# Patient Record
Sex: FEMALE | Race: WHITE | Employment: FULL TIME | URBAN - NONMETROPOLITAN AREA
[De-identification: names, ages, dates, MRNs, and addresses within clinical notes are randomized per-mention and may not be internally consistent; named-entity substitution may affect disease eponyms.]

---

## 2017-01-12 ENCOUNTER — OFFICE VISIT (OUTPATIENT)
Dept: INTERNAL MEDICINE | Age: 52
End: 2017-01-12

## 2017-01-12 VITALS
HEART RATE: 78 BPM | HEIGHT: 64 IN | BODY MASS INDEX: 30.29 KG/M2 | WEIGHT: 177.4 LBS | DIASTOLIC BLOOD PRESSURE: 78 MMHG | RESPIRATION RATE: 16 BRPM | SYSTOLIC BLOOD PRESSURE: 122 MMHG

## 2017-01-12 DIAGNOSIS — K21.9 GASTROESOPHAGEAL REFLUX DISEASE WITHOUT ESOPHAGITIS: ICD-10-CM

## 2017-01-12 DIAGNOSIS — E66.9 NON MORBID OBESITY, UNSPECIFIED OBESITY TYPE: ICD-10-CM

## 2017-01-12 DIAGNOSIS — E03.4 HYPOTHYROIDISM DUE TO ACQUIRED ATROPHY OF THYROID: ICD-10-CM

## 2017-01-12 DIAGNOSIS — R73.01 ELEVATED FASTING GLUCOSE: Primary | ICD-10-CM

## 2017-01-12 DIAGNOSIS — I10 ESSENTIAL HYPERTENSION: ICD-10-CM

## 2017-01-12 DIAGNOSIS — E16.1 HYPERINSULINEMIA: ICD-10-CM

## 2017-01-12 PROCEDURE — 99213 OFFICE O/P EST LOW 20 MIN: CPT | Performed by: INTERNAL MEDICINE

## 2017-03-06 RX ORDER — LISINOPRIL AND HYDROCHLOROTHIAZIDE 12.5; 1 MG/1; MG/1
TABLET ORAL
Qty: 90 TABLET | Refills: 3 | Status: SHIPPED | OUTPATIENT
Start: 2017-03-06 | End: 2018-04-04 | Stop reason: SDUPTHER

## 2017-04-12 ENCOUNTER — OFFICE VISIT (OUTPATIENT)
Dept: PRIMARY CARE CLINIC | Age: 52
End: 2017-04-12
Payer: COMMERCIAL

## 2017-04-12 VITALS
BODY MASS INDEX: 30.73 KG/M2 | TEMPERATURE: 97.7 F | SYSTOLIC BLOOD PRESSURE: 130 MMHG | DIASTOLIC BLOOD PRESSURE: 84 MMHG | WEIGHT: 180 LBS | RESPIRATION RATE: 16 BRPM | OXYGEN SATURATION: 98 % | HEART RATE: 66 BPM | HEIGHT: 64 IN

## 2017-04-12 DIAGNOSIS — J01.40 ACUTE NON-RECURRENT PANSINUSITIS: Primary | ICD-10-CM

## 2017-04-12 PROCEDURE — 99214 OFFICE O/P EST MOD 30 MIN: CPT | Performed by: FAMILY MEDICINE

## 2017-04-12 RX ORDER — GUAIFENESIN AND CODEINE PHOSPHATE 100; 10 MG/5ML; MG/5ML
10 SOLUTION ORAL 4 TIMES DAILY PRN
Qty: 180 ML | Refills: 0 | Status: SHIPPED | OUTPATIENT
Start: 2017-04-12 | End: 2018-01-05 | Stop reason: ALTCHOICE

## 2017-04-12 RX ORDER — AMOXICILLIN AND CLAVULANATE POTASSIUM 500; 125 MG/1; MG/1
1 TABLET, FILM COATED ORAL 2 TIMES DAILY
Qty: 20 TABLET | Refills: 0 | Status: SHIPPED | OUTPATIENT
Start: 2017-04-12 | End: 2017-04-22

## 2017-04-12 ASSESSMENT — ENCOUNTER SYMPTOMS
COUGH: 1
SHORTNESS OF BREATH: 0
VOMITING: 0
WHEEZING: 1
SINUS PRESSURE: 1
DIARRHEA: 0
NAUSEA: 0
ABDOMINAL PAIN: 0
CONSTIPATION: 0
RHINORRHEA: 1
EYE REDNESS: 0
TROUBLE SWALLOWING: 0
SORE THROAT: 0
EYE DISCHARGE: 0

## 2017-06-21 RX ORDER — OMEPRAZOLE 20 MG/1
CAPSULE, DELAYED RELEASE ORAL
Qty: 90 CAPSULE | Refills: 3 | Status: SHIPPED | OUTPATIENT
Start: 2017-06-21 | End: 2018-09-11

## 2017-07-05 ENCOUNTER — OFFICE VISIT (OUTPATIENT)
Dept: INTERNAL MEDICINE | Age: 52
End: 2017-07-05
Payer: COMMERCIAL

## 2017-07-05 VITALS
SYSTOLIC BLOOD PRESSURE: 116 MMHG | HEART RATE: 78 BPM | WEIGHT: 184.4 LBS | RESPIRATION RATE: 16 BRPM | BODY MASS INDEX: 31.48 KG/M2 | DIASTOLIC BLOOD PRESSURE: 78 MMHG | HEIGHT: 64 IN

## 2017-07-05 DIAGNOSIS — R11.2 NON-INTRACTABLE VOMITING WITH NAUSEA, UNSPECIFIED VOMITING TYPE: ICD-10-CM

## 2017-07-05 DIAGNOSIS — I10 ESSENTIAL HYPERTENSION: ICD-10-CM

## 2017-07-05 DIAGNOSIS — R10.13 EPIGASTRIC PAIN: ICD-10-CM

## 2017-07-05 DIAGNOSIS — E03.4 HYPOTHYROIDISM DUE TO ACQUIRED ATROPHY OF THYROID: ICD-10-CM

## 2017-07-05 DIAGNOSIS — R73.01 ELEVATED FASTING GLUCOSE: Primary | ICD-10-CM

## 2017-07-05 DIAGNOSIS — E66.9 NON MORBID OBESITY, UNSPECIFIED OBESITY TYPE: ICD-10-CM

## 2017-07-05 DIAGNOSIS — K21.9 GASTROESOPHAGEAL REFLUX DISEASE WITHOUT ESOPHAGITIS: ICD-10-CM

## 2017-07-05 PROCEDURE — 99214 OFFICE O/P EST MOD 30 MIN: CPT | Performed by: INTERNAL MEDICINE

## 2017-07-05 RX ORDER — LISINOPRIL AND HYDROCHLOROTHIAZIDE 12.5; 1 MG/1; MG/1
1 TABLET ORAL
COMMUNITY
End: 2017-07-05

## 2017-07-05 RX ORDER — COVID-19 ANTIGEN TEST
KIT MISCELLANEOUS
COMMUNITY
End: 2017-07-05 | Stop reason: CLARIF

## 2017-07-05 RX ORDER — LEVOTHYROXINE SODIUM 0.03 MG/1
25 TABLET ORAL
COMMUNITY
End: 2017-07-05 | Stop reason: CLARIF

## 2017-07-05 RX ORDER — OMEPRAZOLE 10 MG/1
20 CAPSULE, DELAYED RELEASE ORAL
COMMUNITY
End: 2017-07-05 | Stop reason: CLARIF

## 2017-07-21 ENCOUNTER — INITIAL CONSULT (OUTPATIENT)
Dept: SURGERY | Age: 52
End: 2017-07-21
Payer: COMMERCIAL

## 2017-07-21 VITALS
HEIGHT: 64 IN | WEIGHT: 186 LBS | SYSTOLIC BLOOD PRESSURE: 120 MMHG | BODY MASS INDEX: 31.76 KG/M2 | DIASTOLIC BLOOD PRESSURE: 80 MMHG | HEART RATE: 70 BPM

## 2017-07-21 DIAGNOSIS — R10.13 EPIGASTRIC PAIN: Primary | ICD-10-CM

## 2017-07-21 PROCEDURE — 99215 OFFICE O/P EST HI 40 MIN: CPT | Performed by: SURGERY

## 2017-08-02 ENCOUNTER — OFFICE VISIT (OUTPATIENT)
Dept: SURGERY | Age: 52
End: 2017-08-02
Payer: COMMERCIAL

## 2017-08-02 VITALS
DIASTOLIC BLOOD PRESSURE: 86 MMHG | HEIGHT: 64 IN | HEART RATE: 78 BPM | TEMPERATURE: 98.8 F | BODY MASS INDEX: 31.58 KG/M2 | RESPIRATION RATE: 16 BRPM | WEIGHT: 185 LBS | SYSTOLIC BLOOD PRESSURE: 138 MMHG

## 2017-08-02 DIAGNOSIS — K80.10 CALCULUS OF GALLBLADDER WITH CHRONIC CHOLECYSTITIS WITHOUT OBSTRUCTION: Primary | ICD-10-CM

## 2017-08-02 PROCEDURE — 86850 RBC ANTIBODY SCREEN: CPT | Performed by: SURGERY

## 2017-08-02 PROCEDURE — 86901 BLOOD TYPING SEROLOGIC RH(D): CPT | Performed by: SURGERY

## 2017-08-02 PROCEDURE — 86900 BLOOD TYPING SEROLOGIC ABO: CPT | Performed by: SURGERY

## 2017-08-02 PROCEDURE — 99215 OFFICE O/P EST HI 40 MIN: CPT | Performed by: SURGERY

## 2017-08-04 DIAGNOSIS — Z13.0 SCREENING FOR DISORDER OF BLOOD AND BLOOD-FORMING ORGANS: Primary | ICD-10-CM

## 2017-08-04 DIAGNOSIS — K80.10 CALCULUS OF GALLBLADDER WITH CHRONIC CHOLECYSTITIS WITHOUT OBSTRUCTION: ICD-10-CM

## 2017-08-04 PROCEDURE — 86850 RBC ANTIBODY SCREEN: CPT | Performed by: SURGERY

## 2017-08-04 PROCEDURE — 86901 BLOOD TYPING SEROLOGIC RH(D): CPT | Performed by: SURGERY

## 2017-08-04 PROCEDURE — 86900 BLOOD TYPING SEROLOGIC ABO: CPT | Performed by: SURGERY

## 2017-08-15 ENCOUNTER — OFFICE VISIT (OUTPATIENT)
Dept: SURGERY | Age: 52
End: 2017-08-15

## 2017-08-15 VITALS
RESPIRATION RATE: 16 BRPM | BODY MASS INDEX: 31.55 KG/M2 | TEMPERATURE: 98 F | DIASTOLIC BLOOD PRESSURE: 88 MMHG | WEIGHT: 184.8 LBS | SYSTOLIC BLOOD PRESSURE: 124 MMHG | HEIGHT: 64 IN | HEART RATE: 80 BPM

## 2017-08-15 DIAGNOSIS — Z48.89 POSTOPERATIVE VISIT: Primary | ICD-10-CM

## 2017-08-15 PROCEDURE — 99024 POSTOP FOLLOW-UP VISIT: CPT | Performed by: SURGERY

## 2017-10-18 ENCOUNTER — TELEPHONE (OUTPATIENT)
Dept: INTERNAL MEDICINE | Age: 52
End: 2017-10-18

## 2017-10-18 NOTE — TELEPHONE ENCOUNTER
Patient has a yeast infection and was previously prescribed triamcinolone 0.025% cream for this. Can this be sent to [facility] pharmacy.

## 2018-01-05 ENCOUNTER — HOSPITAL ENCOUNTER (OUTPATIENT)
Dept: LAB | Age: 53
Setting detail: SPECIMEN
Discharge: HOME | End: 2018-01-05
Payer: COMMERCIAL

## 2018-01-05 ENCOUNTER — OFFICE VISIT (OUTPATIENT)
Dept: INTERNAL MEDICINE | Age: 53
End: 2018-01-05
Payer: COMMERCIAL

## 2018-01-05 VITALS
SYSTOLIC BLOOD PRESSURE: 126 MMHG | TEMPERATURE: 98 F | DIASTOLIC BLOOD PRESSURE: 78 MMHG | RESPIRATION RATE: 16 BRPM | WEIGHT: 188.6 LBS | HEART RATE: 68 BPM | BODY MASS INDEX: 31.42 KG/M2 | HEIGHT: 65 IN

## 2018-01-05 DIAGNOSIS — E66.9 NON MORBID OBESITY, UNSPECIFIED OBESITY TYPE: ICD-10-CM

## 2018-01-05 DIAGNOSIS — R73.01 ELEVATED FASTING GLUCOSE: Primary | ICD-10-CM

## 2018-01-05 DIAGNOSIS — E66.9 CLASS 1 OBESITY WITHOUT SERIOUS COMORBIDITY WITH BODY MASS INDEX (BMI) OF 31.0 TO 31.9 IN ADULT, UNSPECIFIED OBESITY TYPE: ICD-10-CM

## 2018-01-05 DIAGNOSIS — R79.89 ELEVATED LIVER FUNCTION TESTS: ICD-10-CM

## 2018-01-05 DIAGNOSIS — K21.9 GASTROESOPHAGEAL REFLUX DISEASE WITHOUT ESOPHAGITIS: ICD-10-CM

## 2018-01-05 DIAGNOSIS — E03.4 HYPOTHYROIDISM DUE TO ACQUIRED ATROPHY OF THYROID: ICD-10-CM

## 2018-01-05 DIAGNOSIS — R73.01 ELEVATED FASTING GLUCOSE: ICD-10-CM

## 2018-01-05 DIAGNOSIS — I10 ESSENTIAL HYPERTENSION: ICD-10-CM

## 2018-01-05 LAB
ABSOLUTE EOS #: 0.2 K/UL (ref 0–0.4)
ABSOLUTE IMMATURE GRANULOCYTE: NORMAL K/UL (ref 0–0.3)
ABSOLUTE LYMPH #: 2.2 K/UL (ref 1–4.8)
ABSOLUTE MONO #: 0.4 K/UL (ref 0.1–1.2)
ALBUMIN SERPL-MCNC: 4.6 G/DL (ref 3.5–5.2)
ALBUMIN/GLOBULIN RATIO: 1.5 (ref 1–2.5)
ALP BLD-CCNC: 65 U/L (ref 35–104)
ALT SERPL-CCNC: 35 U/L (ref 5–33)
ANION GAP SERPL CALCULATED.3IONS-SCNC: 14 MMOL/L (ref 9–17)
AST SERPL-CCNC: 22 U/L
BASOPHILS # BLD: 1 % (ref 0–1)
BASOPHILS ABSOLUTE: 0.1 K/UL (ref 0–0.2)
BILIRUB SERPL-MCNC: 0.66 MG/DL (ref 0.3–1.2)
BUN BLDV-MCNC: 20 MG/DL (ref 6–20)
BUN/CREAT BLD: 25 (ref 9–20)
CALCIUM SERPL-MCNC: 9.6 MG/DL (ref 8.6–10.4)
CHLORIDE BLD-SCNC: 98 MMOL/L (ref 98–107)
CHOLESTEROL/HDL RATIO: 3.4
CHOLESTEROL: 163 MG/DL
CO2: 27 MMOL/L (ref 20–31)
CREAT SERPL-MCNC: 0.81 MG/DL (ref 0.5–0.9)
DIFFERENTIAL TYPE: NORMAL
EOSINOPHILS RELATIVE PERCENT: 3 % (ref 1–7)
ESTIMATED AVERAGE GLUCOSE: 103 MG/DL
GFR AFRICAN AMERICAN: >60 ML/MIN
GFR NON-AFRICAN AMERICAN: >60 ML/MIN
GFR SERPL CREATININE-BSD FRML MDRD: ABNORMAL ML/MIN/{1.73_M2}
GFR SERPL CREATININE-BSD FRML MDRD: ABNORMAL ML/MIN/{1.73_M2}
GLUCOSE BLD-MCNC: 122 MG/DL (ref 70–99)
HBA1C MFR BLD: 5.2 % (ref 4.8–5.9)
HCT VFR BLD CALC: 45.2 % (ref 36–46)
HDLC SERPL-MCNC: 48 MG/DL
HEMOGLOBIN: 15.2 G/DL (ref 12–16)
IMMATURE GRANULOCYTES: NORMAL %
LDL CHOLESTEROL: 94 MG/DL (ref 0–130)
LYMPHOCYTES # BLD: 32 % (ref 16–46)
MCH RBC QN AUTO: 29.3 PG (ref 26–34)
MCHC RBC AUTO-ENTMCNC: 33.7 G/DL (ref 31–37)
MCV RBC AUTO: 87 FL (ref 80–100)
MONOCYTES # BLD: 6 % (ref 4–11)
PDW BLD-RTO: 13.9 % (ref 11–14.5)
PLATELET # BLD: 190 K/UL (ref 140–450)
PLATELET ESTIMATE: NORMAL
PMV BLD AUTO: 8.3 FL (ref 6–12)
POTASSIUM SERPL-SCNC: 4.1 MMOL/L (ref 3.7–5.3)
RBC # BLD: 5.19 M/UL (ref 4–5.2)
RBC # BLD: NORMAL 10*6/UL
SEG NEUTROPHILS: 58 % (ref 43–77)
SEGMENTED NEUTROPHILS ABSOLUTE COUNT: 4.1 K/UL (ref 1.8–7.7)
SODIUM BLD-SCNC: 139 MMOL/L (ref 135–144)
THYROXINE, FREE: 1.03 NG/DL (ref 0.93–1.7)
TOTAL PROTEIN: 7.7 G/DL (ref 6.4–8.3)
TRIGL SERPL-MCNC: 107 MG/DL
TSH SERPL DL<=0.05 MIU/L-ACNC: 1.1 MIU/L (ref 0.3–5)
VLDLC SERPL CALC-MCNC: NORMAL MG/DL (ref 1–30)
WBC # BLD: 7 K/UL (ref 3.5–11)
WBC # BLD: NORMAL 10*3/UL

## 2018-01-05 PROCEDURE — 83036 HEMOGLOBIN GLYCOSYLATED A1C: CPT

## 2018-01-05 PROCEDURE — 36415 COLL VENOUS BLD VENIPUNCTURE: CPT

## 2018-01-05 PROCEDURE — 80053 COMPREHEN METABOLIC PANEL: CPT

## 2018-01-05 PROCEDURE — 85025 COMPLETE CBC W/AUTO DIFF WBC: CPT

## 2018-01-05 PROCEDURE — 99214 OFFICE O/P EST MOD 30 MIN: CPT | Performed by: INTERNAL MEDICINE

## 2018-01-05 PROCEDURE — 84443 ASSAY THYROID STIM HORMONE: CPT

## 2018-01-05 PROCEDURE — 80061 LIPID PANEL: CPT

## 2018-01-05 PROCEDURE — 84439 ASSAY OF FREE THYROXINE: CPT

## 2018-01-05 ASSESSMENT — PATIENT HEALTH QUESTIONNAIRE - PHQ9
SUM OF ALL RESPONSES TO PHQ9 QUESTIONS 1 & 2: 0
2. FEELING DOWN, DEPRESSED OR HOPELESS: 0
SUM OF ALL RESPONSES TO PHQ QUESTIONS 1-9: 0
1. LITTLE INTEREST OR PLEASURE IN DOING THINGS: 0

## 2018-01-05 NOTE — PATIENT INSTRUCTIONS
Patient Education        Eating Healthy Foods: Care Instructions  Your Care Instructions    Eating healthy foods can help lower your risk for disease. Healthy food gives you energy and keeps your heart strong, your brain active, your muscles working, and your bones strong. A healthy diet includes a variety of foods from the basic food groups: grains, vegetables, fruits, milk and milk products, and meat and beans. Some people may eat more of their favorite foods from only one food group and, as a result, miss getting the nutrients they need. So, it is important to pay attention not only to what you eat but also to what you are missing from your diet. You can eat a healthy, balanced diet by making a few small changes. Follow-up care is a key part of your treatment and safety. Be sure to make and go to all appointments, and call your doctor if you are having problems. It's also a good idea to know your test results and keep a list of the medicines you take. How can you care for yourself at home? Look at what you eat  · Keep a food diary for a week or two and record everything you eat or drink. Track the number of servings you eat from each food group. · For a balanced diet every day, eat a variety of:  ¨ 6 or more ounce-equivalents of grains, such as cereals, breads, crackers, rice, or pasta, every day. An ounce-equivalent is 1 slice of bread, 1 cup of ready-to-eat cereal, or ½ cup of cooked rice, cooked pasta, or cooked cereal.  ¨ 2½ cups of vegetables, especially:  § Dark-green vegetables such as broccoli and spinach. § Orange vegetables such as carrots and sweet potatoes. § Dry beans (such as phillips and kidney beans) and peas (such as lentils). ¨ 2 cups of fresh, frozen, or canned fruit. A small apple or 1 banana or orange equals 1 cup. ¨ 3 cups of nonfat or low-fat milk, yogurt, or other milk products. ¨ 5½ ounces of meat and beans, such as chicken, fish, lean meat, beans, nuts, and seeds.  One egg, 1

## 2018-02-09 DIAGNOSIS — Z12.31 SCREENING MAMMOGRAM, ENCOUNTER FOR: Primary | ICD-10-CM

## 2018-02-27 ENCOUNTER — HOSPITAL ENCOUNTER (OUTPATIENT)
Dept: MAMMOGRAPHY | Age: 53
Discharge: HOME | End: 2018-03-01
Payer: COMMERCIAL

## 2018-02-27 DIAGNOSIS — Z12.31 SCREENING MAMMOGRAM, ENCOUNTER FOR: ICD-10-CM

## 2018-02-27 PROCEDURE — 77063 BREAST TOMOSYNTHESIS BI: CPT

## 2018-03-01 ENCOUNTER — TELEPHONE (OUTPATIENT)
Dept: INTERNAL MEDICINE | Age: 53
End: 2018-03-01

## 2018-03-01 DIAGNOSIS — R92.2 INCONCLUSIVE MAMMOGRAM: Primary | ICD-10-CM

## 2018-03-07 ENCOUNTER — HOSPITAL ENCOUNTER (OUTPATIENT)
Dept: MAMMOGRAPHY | Age: 53
Discharge: HOME | End: 2018-03-09
Payer: COMMERCIAL

## 2018-03-07 DIAGNOSIS — R92.2 INCONCLUSIVE MAMMOGRAM: ICD-10-CM

## 2018-03-07 PROCEDURE — 77065 DX MAMMO INCL CAD UNI: CPT

## 2018-04-04 RX ORDER — LEVOTHYROXINE SODIUM 0.07 MG/1
TABLET ORAL
Qty: 45 TABLET | Refills: 3 | Status: SHIPPED | OUTPATIENT
Start: 2018-04-04 | End: 2019-04-18 | Stop reason: SDUPTHER

## 2018-04-04 RX ORDER — LISINOPRIL AND HYDROCHLOROTHIAZIDE 12.5; 1 MG/1; MG/1
TABLET ORAL
Qty: 90 TABLET | Refills: 3 | Status: SHIPPED | OUTPATIENT
Start: 2018-04-04 | End: 2019-04-18 | Stop reason: SDUPTHER

## 2018-04-08 ENCOUNTER — HOSPITAL ENCOUNTER (EMERGENCY)
Age: 53
Discharge: HOME | End: 2018-04-08
Attending: FAMILY MEDICINE
Payer: COMMERCIAL

## 2018-04-08 ENCOUNTER — APPOINTMENT (OUTPATIENT)
Dept: GENERAL RADIOLOGY | Age: 53
End: 2018-04-08
Payer: COMMERCIAL

## 2018-04-08 VITALS
OXYGEN SATURATION: 98 % | HEART RATE: 88 BPM | DIASTOLIC BLOOD PRESSURE: 75 MMHG | TEMPERATURE: 99 F | SYSTOLIC BLOOD PRESSURE: 140 MMHG | RESPIRATION RATE: 18 BRPM

## 2018-04-08 DIAGNOSIS — S52.352A CLOSED DISPLACED COMMINUTED FRACTURE OF SHAFT OF LEFT RADIUS, INITIAL ENCOUNTER: Primary | ICD-10-CM

## 2018-04-08 DIAGNOSIS — M25.332 SCAPHOLUNATE DISSOCIATION OF LEFT WRIST: ICD-10-CM

## 2018-04-08 PROCEDURE — 73110 X-RAY EXAM OF WRIST: CPT

## 2018-04-08 PROCEDURE — 73130 X-RAY EXAM OF HAND: CPT

## 2018-04-08 PROCEDURE — 99283 EMERGENCY DEPT VISIT LOW MDM: CPT

## 2018-04-08 ASSESSMENT — ENCOUNTER SYMPTOMS
RHINORRHEA: 0
NAUSEA: 0
BACK PAIN: 0
VOICE CHANGE: 0
COUGH: 0
ABDOMINAL DISTENTION: 0
WHEEZING: 0
STRIDOR: 0
EYE REDNESS: 0
ABDOMINAL PAIN: 0
FACIAL SWELLING: 0
VOMITING: 0
PHOTOPHOBIA: 0
CHEST TIGHTNESS: 0
EYE PAIN: 0
COLOR CHANGE: 0
CONSTIPATION: 0
EYE DISCHARGE: 0
DIARRHEA: 0
SHORTNESS OF BREATH: 0
SORE THROAT: 0

## 2018-04-08 ASSESSMENT — PAIN SCALES - GENERAL
PAINLEVEL_OUTOF10: 7
PAINLEVEL_OUTOF10: 3

## 2018-04-08 ASSESSMENT — PAIN DESCRIPTION - LOCATION: LOCATION: ARM

## 2018-04-08 ASSESSMENT — PAIN DESCRIPTION - ORIENTATION
ORIENTATION: RIGHT
ORIENTATION: RIGHT

## 2018-05-30 ENCOUNTER — OFFICE VISIT (OUTPATIENT)
Dept: FAMILY MEDICINE CLINIC | Age: 53
End: 2018-05-30
Payer: COMMERCIAL

## 2018-05-30 VITALS
HEART RATE: 80 BPM | HEIGHT: 64 IN | TEMPERATURE: 97.2 F | WEIGHT: 195 LBS | BODY MASS INDEX: 33.29 KG/M2 | SYSTOLIC BLOOD PRESSURE: 138 MMHG | DIASTOLIC BLOOD PRESSURE: 88 MMHG

## 2018-05-30 DIAGNOSIS — B02.9 HERPES ZOSTER WITHOUT COMPLICATION: Primary | ICD-10-CM

## 2018-05-30 PROCEDURE — 99213 OFFICE O/P EST LOW 20 MIN: CPT | Performed by: PHYSICIAN ASSISTANT

## 2018-05-30 RX ORDER — PREDNISONE 20 MG/1
20 TABLET ORAL 2 TIMES DAILY
Qty: 10 TABLET | Refills: 0 | Status: SHIPPED | OUTPATIENT
Start: 2018-05-30 | End: 2018-06-04

## 2018-05-30 RX ORDER — VALACYCLOVIR HYDROCHLORIDE 1 G/1
1000 TABLET, FILM COATED ORAL 3 TIMES DAILY
Qty: 21 TABLET | Refills: 0 | Status: SHIPPED | OUTPATIENT
Start: 2018-05-30 | End: 2018-06-06 | Stop reason: SDUPTHER

## 2018-05-30 ASSESSMENT — ENCOUNTER SYMPTOMS
GASTROINTESTINAL NEGATIVE: 1
RESPIRATORY NEGATIVE: 1

## 2018-06-06 ENCOUNTER — TELEPHONE (OUTPATIENT)
Dept: FAMILY MEDICINE CLINIC | Age: 53
End: 2018-06-06

## 2018-06-06 DIAGNOSIS — B02.9 HERPES ZOSTER WITHOUT COMPLICATION: ICD-10-CM

## 2018-06-06 RX ORDER — VALACYCLOVIR HYDROCHLORIDE 1 G/1
1000 TABLET, FILM COATED ORAL 3 TIMES DAILY
Qty: 21 TABLET | Refills: 0 | Status: SHIPPED | OUTPATIENT
Start: 2018-06-06 | End: 2018-06-13

## 2018-06-25 ENCOUNTER — TELEPHONE (OUTPATIENT)
Dept: FAMILY MEDICINE CLINIC | Age: 53
End: 2018-06-25

## 2018-07-19 ENCOUNTER — TELEPHONE (OUTPATIENT)
Dept: INTERNAL MEDICINE | Age: 53
End: 2018-07-19

## 2018-09-11 ENCOUNTER — HOSPITAL ENCOUNTER (OUTPATIENT)
Dept: LAB | Age: 53
Setting detail: SPECIMEN
Discharge: HOME | End: 2018-09-11
Payer: COMMERCIAL

## 2018-09-11 ENCOUNTER — OFFICE VISIT (OUTPATIENT)
Dept: INTERNAL MEDICINE | Age: 53
End: 2018-09-11
Payer: COMMERCIAL

## 2018-09-11 VITALS
DIASTOLIC BLOOD PRESSURE: 78 MMHG | BODY MASS INDEX: 32.96 KG/M2 | HEART RATE: 80 BPM | SYSTOLIC BLOOD PRESSURE: 112 MMHG | TEMPERATURE: 99 F | WEIGHT: 192 LBS

## 2018-09-11 DIAGNOSIS — R73.01 ELEVATED FASTING GLUCOSE: ICD-10-CM

## 2018-09-11 DIAGNOSIS — R73.01 ELEVATED FASTING GLUCOSE: Primary | ICD-10-CM

## 2018-09-11 DIAGNOSIS — E03.4 HYPOTHYROIDISM DUE TO ACQUIRED ATROPHY OF THYROID: ICD-10-CM

## 2018-09-11 DIAGNOSIS — E66.9 CLASS 1 OBESITY WITHOUT SERIOUS COMORBIDITY WITH BODY MASS INDEX (BMI) OF 31.0 TO 31.9 IN ADULT, UNSPECIFIED OBESITY TYPE: ICD-10-CM

## 2018-09-11 DIAGNOSIS — I10 ESSENTIAL HYPERTENSION: ICD-10-CM

## 2018-09-11 DIAGNOSIS — K21.9 GASTROESOPHAGEAL REFLUX DISEASE WITHOUT ESOPHAGITIS: ICD-10-CM

## 2018-09-11 LAB
ALBUMIN SERPL-MCNC: 4.4 G/DL (ref 3.5–5.2)
ALBUMIN/GLOBULIN RATIO: 1.2 (ref 1–2.5)
ALP BLD-CCNC: 65 U/L (ref 35–104)
ALT SERPL-CCNC: 31 U/L (ref 5–33)
ANION GAP SERPL CALCULATED.3IONS-SCNC: 14 MMOL/L (ref 9–17)
AST SERPL-CCNC: 22 U/L
BILIRUB SERPL-MCNC: 0.6 MG/DL (ref 0.3–1.2)
BUN BLDV-MCNC: 14 MG/DL (ref 6–20)
BUN/CREAT BLD: 16 (ref 9–20)
CALCIUM SERPL-MCNC: 9.6 MG/DL (ref 8.6–10.4)
CHLORIDE BLD-SCNC: 93 MMOL/L (ref 98–107)
CO2: 27 MMOL/L (ref 20–31)
CREAT SERPL-MCNC: 0.85 MG/DL (ref 0.5–0.9)
ESTIMATED AVERAGE GLUCOSE: 105 MG/DL
GFR AFRICAN AMERICAN: >60 ML/MIN
GFR NON-AFRICAN AMERICAN: >60 ML/MIN
GFR SERPL CREATININE-BSD FRML MDRD: ABNORMAL ML/MIN/{1.73_M2}
GFR SERPL CREATININE-BSD FRML MDRD: ABNORMAL ML/MIN/{1.73_M2}
GLUCOSE BLD-MCNC: 130 MG/DL (ref 70–99)
HBA1C MFR BLD: 5.3 % (ref 4.8–5.9)
POTASSIUM SERPL-SCNC: 3.9 MMOL/L (ref 3.7–5.3)
SODIUM BLD-SCNC: 134 MMOL/L (ref 135–144)
TOTAL PROTEIN: 8.2 G/DL (ref 6.4–8.3)

## 2018-09-11 PROCEDURE — 80053 COMPREHEN METABOLIC PANEL: CPT

## 2018-09-11 PROCEDURE — 99213 OFFICE O/P EST LOW 20 MIN: CPT | Performed by: NURSE PRACTITIONER

## 2018-09-11 PROCEDURE — 36415 COLL VENOUS BLD VENIPUNCTURE: CPT

## 2018-09-11 PROCEDURE — 83036 HEMOGLOBIN GLYCOSYLATED A1C: CPT

## 2018-09-11 ASSESSMENT — ENCOUNTER SYMPTOMS
CHEST TIGHTNESS: 0
SHORTNESS OF BREATH: 0
DIARRHEA: 0
NAUSEA: 0
VOMITING: 0
SORE THROAT: 0

## 2018-09-11 ASSESSMENT — PATIENT HEALTH QUESTIONNAIRE - PHQ9
1. LITTLE INTEREST OR PLEASURE IN DOING THINGS: 0
SUM OF ALL RESPONSES TO PHQ9 QUESTIONS 1 & 2: 0
SUM OF ALL RESPONSES TO PHQ QUESTIONS 1-9: 0
SUM OF ALL RESPONSES TO PHQ QUESTIONS 1-9: 0
2. FEELING DOWN, DEPRESSED OR HOPELESS: 0

## 2018-09-11 NOTE — PROGRESS NOTES
[facility]  [address] [place], [address] [place]  [phone]      Birdie Cavazos is a 48 y.o. female who presents today for her medical conditions/complaints as noted below. Birdie Cavazos is c/o of Hypertension (6 month follow up. Completed blood work this morning. Had shingles in May. About 3 weeks later had a few other spots. Wondering about the vaccine)      HPI:     HPI  Patient presents for ongoing evaluation and management of her elevated fasting glucose, hypertension, hypothyroidism, gastroesophageal reflux disease, and obesity. Elevated fasting glucose is stable with A1C 5.3, continues to take metformin and work on diet. Hypertension is well controlled on lisinopril/HCTZ, denies chest pain or dyspnea. Hypothyroidism is stable on current dose of levothyroxine. GERD is stable without medication. She did have an right distal radius fracture that required surgical repair. in April which has been healing well, has been following with Dr. Shashank Lacey in [facility]. She has had a Dexa scan in 2014. Weight has been stable. Encouraged to obtain pap smear. Current Outpatient Prescriptions   Medication Sig Dispense Refill    lisinopril-hydrochlorothiazide (PRINZIDE;ZESTORETIC) 10-12.5 MG per tablet TAKE 1 TABLET BY MOUTH DAILY 90 tablet 3    levothyroxine (SYNTHROID) 75 MCG tablet TAKE 1/2 TABLET EVERY DAY 45 tablet 3    metFORMIN (GLUCOPHAGE) 500 MG tablet TAKE 1 TABLET BY MOUTH DAILY 30 tablet 11     No current facility-administered medications for this visit.       No Known Allergies    Health Maintenance   Topic Date Due    DTaP/Tdap/Td vaccine (2 - Td) 01/12/2015    Shingles Vaccine (1 of 2 - 2 Dose Series) 06/27/2015    Cervical cancer screen  06/09/2017    Flu vaccine (1) 09/01/2018    TSH testing  01/05/2019    A1C test (Diabetic or Prediabetic)  09/11/2019    Potassium monitoring  09/11/2019    Creatinine monitoring  09/11/2019    Breast cancer screen  03/07/2020  Colon cancer screen colonoscopy  02/12/2021    Lipid screen  01/05/2023    Hepatitis C screen  Completed    HIV screen  Completed       Subjective:      Review of Systems   Constitutional: Negative for chills and fever. HENT: Negative for congestion and sore throat. Respiratory: Negative for chest tightness and shortness of breath. Cardiovascular: Negative for chest pain and leg swelling. Gastrointestinal: Negative for diarrhea, nausea and vomiting. Genitourinary: Negative for difficulty urinating and dysuria. Musculoskeletal: Negative for gait problem and myalgias. Neurological: Negative for dizziness and headaches. Psychiatric/Behavioral: Negative for behavioral problems and confusion. Objective:     Vitals:    09/11/18 0813   BP: 112/78   Site: Right Upper Arm   Position: Sitting   Pulse: 80   Temp: 99 °F (37.2 °C)   TempSrc: Tympanic   Weight: 192 lb (87.1 kg)     Physical Exam   Constitutional: She appears well-developed and well-nourished. No distress. HENT:   Head: Normocephalic and atraumatic. Right Ear: Tympanic membrane, external ear and ear canal normal.   Left Ear: Tympanic membrane, external ear and ear canal normal.   Nose: Nose normal. No rhinorrhea or nasal deformity. Mouth/Throat: Oropharynx is clear and moist. No oropharyngeal exudate. Eyes: Conjunctivae, EOM and lids are normal.   Neck: Neck supple. No thyromegaly present. Cardiovascular: Normal rate and regular rhythm. PMI is not displaced. No murmur heard. Pulmonary/Chest: Effort normal and breath sounds normal. No accessory muscle usage. No respiratory distress. She has no wheezes. She has no rhonchi. She has no rales. Abdominal: Soft. There is no tenderness. Musculoskeletal: Normal range of motion. She exhibits no edema. Lymphadenopathy:     She has no cervical adenopathy. Neurological: She is alert. Coordination and gait normal.   Skin: Skin is warm and dry. No cyanosis.  Nails show no

## 2018-12-13 DIAGNOSIS — M25.531 RIGHT WRIST PAIN: Primary | ICD-10-CM

## 2018-12-18 ENCOUNTER — OFFICE VISIT (OUTPATIENT)
Dept: ORTHOPEDIC SURGERY | Age: 53
End: 2018-12-18
Payer: COMMERCIAL

## 2018-12-18 ENCOUNTER — HOSPITAL ENCOUNTER (OUTPATIENT)
Dept: GENERAL RADIOLOGY | Age: 53
Discharge: HOME | End: 2018-12-20
Payer: COMMERCIAL

## 2018-12-18 VITALS
SYSTOLIC BLOOD PRESSURE: 124 MMHG | WEIGHT: 192 LBS | HEIGHT: 64 IN | HEART RATE: 64 BPM | DIASTOLIC BLOOD PRESSURE: 84 MMHG | BODY MASS INDEX: 32.78 KG/M2

## 2018-12-18 DIAGNOSIS — G56.01 RIGHT CARPAL TUNNEL SYNDROME: ICD-10-CM

## 2018-12-18 DIAGNOSIS — R20.2 NUMBNESS AND TINGLING: Primary | ICD-10-CM

## 2018-12-18 DIAGNOSIS — M25.531 RIGHT WRIST PAIN: ICD-10-CM

## 2018-12-18 DIAGNOSIS — R20.0 NUMBNESS AND TINGLING: Primary | ICD-10-CM

## 2018-12-18 PROCEDURE — 73110 X-RAY EXAM OF WRIST: CPT

## 2018-12-18 PROCEDURE — 73130 X-RAY EXAM OF HAND: CPT

## 2018-12-18 PROCEDURE — 99202 OFFICE O/P NEW SF 15 MIN: CPT | Performed by: PHYSICIAN ASSISTANT

## 2019-02-07 ENCOUNTER — OFFICE VISIT (OUTPATIENT)
Dept: PODIATRY | Age: 54
End: 2019-02-07
Payer: COMMERCIAL

## 2019-02-07 VITALS
WEIGHT: 196 LBS | HEIGHT: 64 IN | BODY MASS INDEX: 33.46 KG/M2 | SYSTOLIC BLOOD PRESSURE: 124 MMHG | HEART RATE: 76 BPM | RESPIRATION RATE: 20 BRPM | DIASTOLIC BLOOD PRESSURE: 80 MMHG

## 2019-02-07 DIAGNOSIS — M72.2 PLANTAR FASCIITIS OF LEFT FOOT: Primary | ICD-10-CM

## 2019-02-07 PROCEDURE — 99203 OFFICE O/P NEW LOW 30 MIN: CPT | Performed by: PODIATRIST

## 2019-02-07 RX ORDER — METHYLPREDNISOLONE 4 MG/1
TABLET ORAL
Qty: 1 KIT | Refills: 0 | Status: SHIPPED | OUTPATIENT
Start: 2019-02-07 | End: 2019-03-11 | Stop reason: ALTCHOICE

## 2019-02-26 ENCOUNTER — TELEPHONE (OUTPATIENT)
Dept: ORTHOPEDIC SURGERY | Age: 54
End: 2019-02-26

## 2019-02-26 DIAGNOSIS — G56.01 RIGHT CARPAL TUNNEL SYNDROME: Primary | ICD-10-CM

## 2019-03-01 DIAGNOSIS — G56.01 RIGHT CARPAL TUNNEL SYNDROME: ICD-10-CM

## 2019-03-11 ENCOUNTER — OFFICE VISIT (OUTPATIENT)
Dept: INTERNAL MEDICINE | Age: 54
End: 2019-03-11
Payer: COMMERCIAL

## 2019-03-11 ENCOUNTER — HOSPITAL ENCOUNTER (OUTPATIENT)
Dept: LAB | Age: 54
Discharge: HOME | End: 2019-03-11
Payer: COMMERCIAL

## 2019-03-11 VITALS
HEIGHT: 64 IN | BODY MASS INDEX: 34.38 KG/M2 | RESPIRATION RATE: 16 BRPM | HEART RATE: 78 BPM | DIASTOLIC BLOOD PRESSURE: 84 MMHG | SYSTOLIC BLOOD PRESSURE: 128 MMHG | WEIGHT: 201.4 LBS

## 2019-03-11 DIAGNOSIS — R73.01 ELEVATED FASTING GLUCOSE: ICD-10-CM

## 2019-03-11 DIAGNOSIS — K21.9 GASTROESOPHAGEAL REFLUX DISEASE WITHOUT ESOPHAGITIS: ICD-10-CM

## 2019-03-11 DIAGNOSIS — R73.01 ELEVATED FASTING GLUCOSE: Primary | ICD-10-CM

## 2019-03-11 DIAGNOSIS — E03.4 HYPOTHYROIDISM DUE TO ACQUIRED ATROPHY OF THYROID: ICD-10-CM

## 2019-03-11 DIAGNOSIS — E66.9 CLASS 1 OBESITY WITHOUT SERIOUS COMORBIDITY WITH BODY MASS INDEX (BMI) OF 34.0 TO 34.9 IN ADULT, UNSPECIFIED OBESITY TYPE: ICD-10-CM

## 2019-03-11 DIAGNOSIS — I10 ESSENTIAL HYPERTENSION: ICD-10-CM

## 2019-03-11 DIAGNOSIS — Z12.31 SCREENING MAMMOGRAM, ENCOUNTER FOR: ICD-10-CM

## 2019-03-11 LAB
ABSOLUTE EOS #: 0.2 K/UL (ref 0–0.4)
ABSOLUTE IMMATURE GRANULOCYTE: NORMAL K/UL (ref 0–0.3)
ABSOLUTE LYMPH #: 2.2 K/UL (ref 1–4.8)
ABSOLUTE MONO #: 0.4 K/UL (ref 0.1–1.2)
ALBUMIN SERPL-MCNC: 4.5 G/DL (ref 3.5–5.2)
ALBUMIN/GLOBULIN RATIO: 1.5 (ref 1–2.5)
ALP BLD-CCNC: 66 U/L (ref 35–104)
ALT SERPL-CCNC: 28 U/L (ref 5–33)
ANION GAP SERPL CALCULATED.3IONS-SCNC: 13 MMOL/L (ref 9–17)
AST SERPL-CCNC: 17 U/L
BASOPHILS # BLD: 1 % (ref 0–1)
BASOPHILS ABSOLUTE: 0.1 K/UL (ref 0–0.2)
BILIRUB SERPL-MCNC: 0.61 MG/DL (ref 0.3–1.2)
BUN BLDV-MCNC: 14 MG/DL (ref 6–20)
BUN/CREAT BLD: 19 (ref 9–20)
CALCIUM SERPL-MCNC: 9.8 MG/DL (ref 8.6–10.4)
CHLORIDE BLD-SCNC: 100 MMOL/L (ref 98–107)
CHOLESTEROL/HDL RATIO: 3.6
CHOLESTEROL: 177 MG/DL
CO2: 27 MMOL/L (ref 20–31)
CREAT SERPL-MCNC: 0.73 MG/DL (ref 0.5–0.9)
DIFFERENTIAL TYPE: NORMAL
EOSINOPHILS RELATIVE PERCENT: 3 % (ref 1–7)
ESTIMATED AVERAGE GLUCOSE: 103 MG/DL
GFR AFRICAN AMERICAN: >60 ML/MIN
GFR NON-AFRICAN AMERICAN: >60 ML/MIN
GFR SERPL CREATININE-BSD FRML MDRD: ABNORMAL ML/MIN/{1.73_M2}
GFR SERPL CREATININE-BSD FRML MDRD: ABNORMAL ML/MIN/{1.73_M2}
GLUCOSE BLD-MCNC: 125 MG/DL (ref 70–99)
HBA1C MFR BLD: 5.2 % (ref 4.8–5.9)
HCT VFR BLD CALC: 44.9 % (ref 36–46)
HDLC SERPL-MCNC: 49 MG/DL
HEMOGLOBIN: 14.8 G/DL (ref 12–16)
IMMATURE GRANULOCYTES: NORMAL %
LDL CHOLESTEROL: 90 MG/DL (ref 0–130)
LYMPHOCYTES # BLD: 36 % (ref 16–46)
MCH RBC QN AUTO: 29.1 PG (ref 26–34)
MCHC RBC AUTO-ENTMCNC: 33 G/DL (ref 31–37)
MCV RBC AUTO: 88.2 FL (ref 80–100)
MONOCYTES # BLD: 7 % (ref 4–11)
NRBC AUTOMATED: NORMAL PER 100 WBC
PDW BLD-RTO: 14.2 % (ref 11–14.5)
PLATELET # BLD: 190 K/UL (ref 140–450)
PLATELET ESTIMATE: NORMAL
PMV BLD AUTO: 8.6 FL (ref 6–12)
POTASSIUM SERPL-SCNC: 4.6 MMOL/L (ref 3.7–5.3)
RBC # BLD: 5.09 M/UL (ref 4–5.2)
RBC # BLD: NORMAL 10*6/UL
SEG NEUTROPHILS: 53 % (ref 43–77)
SEGMENTED NEUTROPHILS ABSOLUTE COUNT: 3.2 K/UL (ref 1.8–7.7)
SODIUM BLD-SCNC: 140 MMOL/L (ref 135–144)
THYROXINE, FREE: 1.02 NG/DL (ref 0.93–1.7)
TOTAL PROTEIN: 7.6 G/DL (ref 6.4–8.3)
TRIGL SERPL-MCNC: 188 MG/DL
TSH SERPL DL<=0.05 MIU/L-ACNC: 1.18 MIU/L (ref 0.3–5)
VLDLC SERPL CALC-MCNC: ABNORMAL MG/DL (ref 1–30)
WBC # BLD: 6 K/UL (ref 3.5–11)
WBC # BLD: NORMAL 10*3/UL

## 2019-03-11 PROCEDURE — 36415 COLL VENOUS BLD VENIPUNCTURE: CPT

## 2019-03-11 PROCEDURE — 80061 LIPID PANEL: CPT

## 2019-03-11 PROCEDURE — 84439 ASSAY OF FREE THYROXINE: CPT

## 2019-03-11 PROCEDURE — 85025 COMPLETE CBC W/AUTO DIFF WBC: CPT

## 2019-03-11 PROCEDURE — 84443 ASSAY THYROID STIM HORMONE: CPT

## 2019-03-11 PROCEDURE — 83036 HEMOGLOBIN GLYCOSYLATED A1C: CPT

## 2019-03-11 PROCEDURE — 99214 OFFICE O/P EST MOD 30 MIN: CPT | Performed by: INTERNAL MEDICINE

## 2019-03-11 PROCEDURE — 80053 COMPREHEN METABOLIC PANEL: CPT

## 2019-03-11 ASSESSMENT — PATIENT HEALTH QUESTIONNAIRE - PHQ9
SUM OF ALL RESPONSES TO PHQ QUESTIONS 1-9: 0
SUM OF ALL RESPONSES TO PHQ QUESTIONS 1-9: 0
2. FEELING DOWN, DEPRESSED OR HOPELESS: 0
SUM OF ALL RESPONSES TO PHQ9 QUESTIONS 1 & 2: 0
1. LITTLE INTEREST OR PLEASURE IN DOING THINGS: 0

## 2019-03-12 ENCOUNTER — HOSPITAL ENCOUNTER (OUTPATIENT)
Dept: MAMMOGRAPHY | Age: 54
Discharge: HOME | End: 2019-03-14
Payer: COMMERCIAL

## 2019-03-12 DIAGNOSIS — Z12.31 SCREENING MAMMOGRAM, ENCOUNTER FOR: ICD-10-CM

## 2019-03-12 PROCEDURE — 77063 BREAST TOMOSYNTHESIS BI: CPT

## 2019-04-18 RX ORDER — LISINOPRIL AND HYDROCHLOROTHIAZIDE 12.5; 1 MG/1; MG/1
TABLET ORAL
Qty: 90 TABLET | Refills: 3 | Status: SHIPPED | OUTPATIENT
Start: 2019-04-18 | End: 2020-05-14

## 2019-04-18 RX ORDER — LEVOTHYROXINE SODIUM 0.07 MG/1
TABLET ORAL
Qty: 45 TABLET | Refills: 3 | Status: SHIPPED | OUTPATIENT
Start: 2019-04-18 | End: 2020-05-14

## 2019-06-05 ENCOUNTER — OFFICE VISIT (OUTPATIENT)
Dept: PRIMARY CARE CLINIC | Age: 54
End: 2019-06-05
Payer: COMMERCIAL

## 2019-06-05 VITALS
HEART RATE: 78 BPM | HEIGHT: 64 IN | WEIGHT: 204.2 LBS | BODY MASS INDEX: 34.86 KG/M2 | TEMPERATURE: 96.8 F | DIASTOLIC BLOOD PRESSURE: 68 MMHG | OXYGEN SATURATION: 99 % | SYSTOLIC BLOOD PRESSURE: 110 MMHG

## 2019-06-05 DIAGNOSIS — J01.90 ACUTE BACTERIAL SINUSITIS: Primary | ICD-10-CM

## 2019-06-05 DIAGNOSIS — B96.89 ACUTE BACTERIAL SINUSITIS: Primary | ICD-10-CM

## 2019-06-05 PROCEDURE — 99213 OFFICE O/P EST LOW 20 MIN: CPT | Performed by: NURSE PRACTITIONER

## 2019-06-05 RX ORDER — AMOXICILLIN AND CLAVULANATE POTASSIUM 875; 125 MG/1; MG/1
1 TABLET, FILM COATED ORAL 2 TIMES DAILY
Qty: 20 TABLET | Refills: 0 | Status: SHIPPED | OUTPATIENT
Start: 2019-06-05 | End: 2019-06-15

## 2019-06-05 ASSESSMENT — ENCOUNTER SYMPTOMS
SHORTNESS OF BREATH: 0
SINUS PRESSURE: 1
SORE THROAT: 1
COUGH: 0
RHINORRHEA: 0
SINUS COMPLAINT: 1
WHEEZING: 0

## 2019-06-05 NOTE — PROGRESS NOTES
Subjective:      Patient ID: Ashli Holguin is a 48 y.o. female. Sinus Problem   This is a new problem. The current episode started in the past 7 days. The problem is unchanged. There has been no fever. Associated symptoms include chills, headaches, sinus pressure and a sore throat. Pertinent negatives include no congestion, coughing or shortness of breath. Past treatments include nothing. The treatment provided no relief. Past Medical History:   Diagnosis Date    Allergic rhinitis     Cubital tunnel syndrome     Left arm, s/p ulnar nerve surgery    Dysmenorrhea     Elevated fasting glucose     Hypertension     Obesity     Orbital fracture ([address] 2011    (Blowout) - left eye.  Plantar fasciitis     Shingles 05/2018       Past Surgical History:   Procedure Laterality Date    BREAST SURGERY Right 2002    surgery for benign bleeding    [address], [facility]IC  [facility]07/[facility]     COLONOSCOPY  2/12/16    hyperplastic polyp  Dr. Kirby Height Left 8/7/2012    Dr. 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   Right upper back: Compound nevus with moderate architectural disorder and moderate cytoloigc atypia -  dysplastic nevus - recommend complete removal    WRIST SURGERY Right 04/12/2018    fracture plates and screws       Social History     Socioeconomic History    Marital status:      Spouse name: None    Number of children: None    Years of education: None    Highest education level: None   Occupational History    None   Social Needs    Financial resource strain: None    Food insecurity:     Worry: None     Inability: None    Transportation needs:     Medical: None     Non-medical: None   Tobacco Use    Smoking status: Never Smoker    Smokeless tobacco: Never Used   Substance and Sexual Activity    Alcohol use: Yes     Alcohol/week: 0.0 oz     Comment: Socially.  Drug use: No    Sexual activity: Yes     Partners: Male   Lifestyle    Physical activity:     Days per week: None     Minutes per session: None    Stress: None   Relationships    Social connections:     Talks on phone: None     Gets together: None     Attends Mosque service: None     Active member of club or organization: None     Attends meetings of clubs or organizations: None     Relationship status: None    Intimate partner violence:     Fear of current or ex partner: None     Emotionally abused: None     Physically abused: None     Forced sexual activity: None   Other Topics Concern    None   Social History Narrative    None       Family History   Problem Relation Age of Onset    High Blood Pressure Father     Crohn's Disease Mother     Heart Disease Maternal Grandmother     Diabetes Maternal Grandmother     Dementia Paternal Grandmother     Heart Disease Paternal Grandfather         age 52    Breast Cancer Maternal Aunt     Breast Cancer Paternal Aunt        No Known Allergies    Current Outpatient Medications   Medication Sig Dispense Refill    amoxicillin-clavulanate (AUGMENTIN) 875-125 MG per tablet Take 1 tablet by mouth 2 times daily for 10 days Take with food. 20 tablet 0    levothyroxine (SYNTHROID) 75 MCG tablet TAKE 1/2 TABLET ONE TIME DAILY 45 tablet 3    lisinopril-hydrochlorothiazide (PRINZIDE;ZESTORETIC) 10-12.5 MG per tablet TAKE ONE TABLET BY MOUTH ONE TIME A DAY 90 tablet 3    metFORMIN (GLUCOPHAGE) 500 MG tablet TAKE ONE TABLET BY MOUTH ONE TIME A DAY 30 tablet 11     No current facility-administered medications for this visit. Review of Systems   Constitutional: Positive for chills. Negative for activity change, appetite change and fever.    HENT:

## 2019-06-25 ENCOUNTER — OFFICE VISIT (OUTPATIENT)
Dept: ORTHOPEDIC SURGERY | Age: 54
End: 2019-06-25
Payer: COMMERCIAL

## 2019-06-25 VITALS
BODY MASS INDEX: 34.83 KG/M2 | WEIGHT: 204 LBS | DIASTOLIC BLOOD PRESSURE: 82 MMHG | HEART RATE: 76 BPM | HEIGHT: 64 IN | SYSTOLIC BLOOD PRESSURE: 130 MMHG

## 2019-06-25 DIAGNOSIS — G56.01 RIGHT CARPAL TUNNEL SYNDROME: Primary | ICD-10-CM

## 2019-06-25 PROCEDURE — 99213 OFFICE O/P EST LOW 20 MIN: CPT | Performed by: NURSE PRACTITIONER

## 2019-06-25 NOTE — PROGRESS NOTES
History:   Procedure Laterality Date    BREAST SURGERY Right     surgery for benign bleeding     SECTION      [facility]     COLONOSCOPY  16    hyperplastic polyp  Dr. Marcy Muniz Left 2012    Dr. 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 Right upper back: Compound nevus with moderate architectural disorder and moderate cytoloigc atypia -  dysplastic nevus - recommend complete removal    WRIST SURGERY Right 2018    fracture plates and screws     Family History   Problem Relation Age of Onset    High Blood Pressure Father     Crohn's Disease Mother     Heart Disease Maternal Grandmother     Diabetes Maternal Grandmother     Dementia Paternal Grandmother     Heart Disease Paternal Grandfather         age 54    Breast Cancer Maternal Aunt     Breast Cancer Paternal Aunt      Social History     Occupational History    Not on file   Tobacco Use    Smoking status: Never Smoker    Smokeless tobacco: Never Used   Substance and Sexual Activity    Alcohol use: Yes     Alcohol/week: 0.0 oz     Comment: Socially.  Drug use: No    Sexual activity: Yes     Partners: Male     REVIEW OF SYSTEMS:  Constitutional: Denies any fever, chills. Derm: Denies any rash or skin color change. Eyes: Denies blurred or decreased in vision. ENT: Denies any tinnitus or vertigo. Resp: Denies any cough or shortness of breath. CV: Denies any syncope, palpitations or chest pain. GI:  Denies any abdominal pain, nausea, vomiting, constipation or diarrhea. : Denies any hematuria, hesitancy, or dysuria. Heme/Lymph: Denies any bleeding.   Musculoskeletal: Positive for right hand and arm numbness, tingling and pain. Neuro: Denies any dizziness. PHYSICAL EXAM:    Blood pressure 130/82, pulse 76, height 5' 4\" (1.626 m), weight 204 lb (92.5 kg), not currently breastfeeding. General Appearance:  Alert and oriented x 3, calm, cooperative, in no acute distress. HEENT:  Normocephalic, atraumatic without obvious deformity. Pupils equal, round, and reactive to light. Conjunctivae/corneas clear. Neck: Supple, with full range of motion. Trachea midline. Respiratory:  Normal respiratory effort. No audible wheezes or rhonchi. Cardiovascular:  Regular rate and rhythm. Abdomen: Soft, nontender, nondistended. Musculoskeletal:   Right upper extremity:  She does have decreased sensation of thumb, index and long fingers of the right hand. Positive Tinel's and positive Phalen's. Good range of motion of the shoulder. Skin: Skin is intact without rashes or lesions noted. Neurologic:  Neurovascularly intact without any focal sensory/motor deficits. Sensation intact. Capillary Refill: Brisk,< 3 seconds   Peripheral Pulses: +2 palpable, equal bilaterally. IMPRESSION:     Right carpal tunnel syndrome    -  Primary     PLAN:  The patient would like to proceed with a right wrist carpal tunnel release under a local.  We will get this scheduled for Iain on July 15, 2019. Sriram Phoenix am personally transcribing for KAYDEN Corbett CNP 6/25/19 at 1:12 PM.    Norma VÁSQUEZ APRN - CNP, personally performed the services described in this document as transcribed by Marie Swan, and it is both accurate and complete.     Electronically signed by KAYDEN Corbett CNP on 6/27/2019 at 9:17 AM

## 2019-07-30 ENCOUNTER — OFFICE VISIT (OUTPATIENT)
Dept: ORTHOPEDIC SURGERY | Age: 54
End: 2019-07-30

## 2019-07-30 VITALS
HEIGHT: 64 IN | HEART RATE: 80 BPM | DIASTOLIC BLOOD PRESSURE: 80 MMHG | WEIGHT: 204 LBS | SYSTOLIC BLOOD PRESSURE: 116 MMHG | BODY MASS INDEX: 34.83 KG/M2

## 2019-07-30 DIAGNOSIS — Z98.890 HISTORY OF CARPAL TUNNEL SURGERY: Primary | ICD-10-CM

## 2019-07-30 PROCEDURE — 99024 POSTOP FOLLOW-UP VISIT: CPT | Performed by: PHYSICIAN ASSISTANT

## 2019-07-31 NOTE — PROGRESS NOTES
[facility] [address]  [address]  [place] [address] [place]  Dept: Edmar Munoz: [phone]    Date of Service:  7/30/2019    Sheryle Pitcairn  YOB: 1965  MRN: [id number]      SUBJECTIVE:  Sheryle Pitcairn is a 47 y. o. who comes to us today for followup of her right wrist pain, carpal tunnel syndrome, carpal tunnel release. This was done a couple weeks ago. She is here today for stitch removal.  She states that the pain still remains in the palmar aspect of the thumb. Numbness and tingling has improved. On exam today, this is a 47 y.o. in no acute distress, alert, oriented, pleasant, cooperative. Right hand was inspected. The volar-based carpal tunnel incision is well healed. Stitches removed without complication or difficulty. She has pain in the thenar eminence. Sensation appears to be improved though in the median nerve distribution. X-RAYS:  None obtained. IMPRESSION:  Stable postop course. PLAN:  Activities as tolerated. Weightbearing as tolerated. See us back here as needed. Coco Oviedo, am personally transcribing for Caroline Burrows PA-C 7/31/19 at 3:08 PM.    I, Caroline Burrows PA-C, personally performed the services described in this document as transcribed by Dago Prasad, and it is both accurate and complete.     Electronically signed by Caroline Burrows PA-C on 8/6/2019 at 5:21 PM

## 2019-09-10 ENCOUNTER — HOSPITAL ENCOUNTER (OUTPATIENT)
Dept: LAB | Age: 54
Discharge: HOME | End: 2019-09-10
Payer: COMMERCIAL

## 2019-09-10 DIAGNOSIS — R73.01 ELEVATED FASTING GLUCOSE: ICD-10-CM

## 2019-09-10 LAB
ANION GAP SERPL CALCULATED.3IONS-SCNC: 13 MMOL/L (ref 9–17)
BUN BLDV-MCNC: 11 MG/DL (ref 6–20)
BUN/CREAT BLD: 14 (ref 9–20)
CALCIUM SERPL-MCNC: 9.7 MG/DL (ref 8.6–10.4)
CHLORIDE BLD-SCNC: 99 MMOL/L (ref 98–107)
CO2: 27 MMOL/L (ref 20–31)
CREAT SERPL-MCNC: 0.78 MG/DL (ref 0.5–0.9)
ESTIMATED AVERAGE GLUCOSE: 97 MG/DL
GFR AFRICAN AMERICAN: >60 ML/MIN
GFR NON-AFRICAN AMERICAN: >60 ML/MIN
GFR SERPL CREATININE-BSD FRML MDRD: ABNORMAL ML/MIN/{1.73_M2}
GFR SERPL CREATININE-BSD FRML MDRD: ABNORMAL ML/MIN/{1.73_M2}
GLUCOSE BLD-MCNC: 130 MG/DL (ref 70–99)
HBA1C MFR BLD: 5 % (ref 4.8–5.9)
POTASSIUM SERPL-SCNC: 4 MMOL/L (ref 3.7–5.3)
SODIUM BLD-SCNC: 139 MMOL/L (ref 135–144)

## 2019-09-10 PROCEDURE — 83036 HEMOGLOBIN GLYCOSYLATED A1C: CPT

## 2019-09-10 PROCEDURE — 80048 BASIC METABOLIC PNL TOTAL CA: CPT

## 2019-09-10 PROCEDURE — 36415 COLL VENOUS BLD VENIPUNCTURE: CPT

## 2019-09-12 ENCOUNTER — OFFICE VISIT (OUTPATIENT)
Dept: INTERNAL MEDICINE | Age: 54
End: 2019-09-12
Payer: COMMERCIAL

## 2019-09-12 VITALS
RESPIRATION RATE: 16 BRPM | HEART RATE: 78 BPM | HEIGHT: 64 IN | DIASTOLIC BLOOD PRESSURE: 78 MMHG | SYSTOLIC BLOOD PRESSURE: 118 MMHG | WEIGHT: 200.4 LBS | BODY MASS INDEX: 34.21 KG/M2

## 2019-09-12 DIAGNOSIS — K21.9 GASTROESOPHAGEAL REFLUX DISEASE WITHOUT ESOPHAGITIS: ICD-10-CM

## 2019-09-12 DIAGNOSIS — R61 NIGHT SWEATS: ICD-10-CM

## 2019-09-12 DIAGNOSIS — I10 ESSENTIAL HYPERTENSION: ICD-10-CM

## 2019-09-12 DIAGNOSIS — E66.9 CLASS 1 OBESITY WITHOUT SERIOUS COMORBIDITY WITH BODY MASS INDEX (BMI) OF 34.0 TO 34.9 IN ADULT, UNSPECIFIED OBESITY TYPE: ICD-10-CM

## 2019-09-12 DIAGNOSIS — E11.9 NEW ONSET TYPE 2 DIABETES MELLITUS (HCC): Primary | ICD-10-CM

## 2019-09-12 DIAGNOSIS — E03.4 HYPOTHYROIDISM DUE TO ACQUIRED ATROPHY OF THYROID: ICD-10-CM

## 2019-09-12 PROCEDURE — 99214 OFFICE O/P EST MOD 30 MIN: CPT | Performed by: INTERNAL MEDICINE

## 2019-09-12 NOTE — PATIENT INSTRUCTIONS
high or too low. The most common symptoms of high blood sugar include:  · Thirst.  · Frequent urination. · Weight loss. · Blurry vision. The symptoms of low blood sugar include:  · Sweating. · Shakiness. · Weakness. · Hunger. · Confusion. How can you prevent type 2 diabetes? The best way to prevent or delay type 2 diabetes is to adopt healthy habits, which include:  · Staying at a healthy weight. · Exercising regularly. · Eating healthy foods. How is type 2 diabetes treated? If you have type 2 diabetes, here are the most important things you can do. · Take your diabetes medicines. · Check your blood sugar as often as your doctor recommends. Also, get a hemoglobin A1c test at least every 6 months. · Try to eat a variety of foods and to spread carbohydrate throughout the day. Carbohydrate raises blood sugar higher and more quickly than any other nutrient does. Carbohydrate is found in sugar, breads and cereals, fruit, starchy vegetables such as potatoes and corn, and milk and yogurt. · Get at least 30 minutes of exercise on most days of the week. Walking is a good choice. You also may want to do other activities, such as running, swimming, cycling, or playing tennis or team sports. If your doctor says it's okay, do muscle-strengthening exercises at least 2 times a week. · See your doctor for checkups and tests on a regular schedule. · If you have high blood pressure or high cholesterol, take the medicines as prescribed by your doctor. · Do not smoke. Smoking can make health problems worse. This includes problems you might have with type 2 diabetes. If you need help quitting, talk to your doctor about stop-smoking programs and medicines. These can increase your chances of quitting for good. Follow-up care is a key part of your treatment and safety. Be sure to make and go to all appointments, and call your doctor if you are having problems.  It's also a good idea to know your test results and keep a

## 2019-09-12 NOTE — PROGRESS NOTES
Yessica received counseling on the following healthy behaviors: nutrition and exercise  Reviewed prior labs and health maintenance  Continue current medications, diet and exercise. Discussed use, benefit, and side effects of prescribed medications. Barriers to medication compliance addressed. Patient given educational materials - see patient instructions  Was a self-tracking handout given in paper form or via [employer]hart? Yes    Requested Prescriptions      No prescriptions requested or ordered in this encounter       All patient questions answered. Patient voiced understanding. Quality Measures    Body mass index is 34.38 kg/m². Elevated. Weight control plan discussed: Healthy diet and regular exercise. BP: 118/78 Blood pressure is normal. Treatment plan: See main progress note. Lab Results   Component Value Date    LDLCHOLESTEROL 90 03/11/2019    (goal LDL reduction with dx if diabetes is 50% LDL reduction)      PHQ Scores 3/11/2019 9/11/2018 5/30/2018 1/5/2018 10/13/2016   PHQ2 Score 0 0 0 0 0   PHQ9 Score 0 0 0 0 0     See progress note for plan, if depression exists. Interpretation of Total Score: Major depression if the answer to questions 1 or 2 and 5 or more of questions 1 to 9 are at least [facility] than half the days. \"  Other depressive syndrome if questions 1 or 2 and two, three, or four of questions 1 to 9 are at least [facility] than half the days\"   Depression Severity: 5-9 = Mild depression, 10-14 = Moderate depression, 15-19 = Moderately severe depression, 20-27 = Severe depression

## 2020-03-10 ENCOUNTER — HOSPITAL ENCOUNTER (OUTPATIENT)
Dept: LAB | Age: 55
Discharge: HOME | End: 2020-03-10
Payer: COMMERCIAL

## 2020-03-10 LAB
ABSOLUTE EOS #: 0.18 K/UL (ref 0–0.44)
ABSOLUTE IMMATURE GRANULOCYTE: <0.03 K/UL (ref 0–0.3)
ABSOLUTE LYMPH #: 2.34 K/UL (ref 1.1–3.7)
ABSOLUTE MONO #: 0.53 K/UL (ref 0.1–1.2)
ALBUMIN SERPL-MCNC: 4.5 G/DL (ref 3.5–5.2)
ALBUMIN/GLOBULIN RATIO: 1.5 (ref 1–2.5)
ALP BLD-CCNC: 65 U/L (ref 35–104)
ALT SERPL-CCNC: 34 U/L (ref 5–33)
ANION GAP SERPL CALCULATED.3IONS-SCNC: 14 MMOL/L (ref 9–17)
AST SERPL-CCNC: 19 U/L
BASOPHILS # BLD: 1 % (ref 0–2)
BASOPHILS ABSOLUTE: 0.07 K/UL (ref 0–0.2)
BILIRUB SERPL-MCNC: 0.55 MG/DL (ref 0.3–1.2)
BUN BLDV-MCNC: 14 MG/DL (ref 6–20)
BUN/CREAT BLD: 18 (ref 9–20)
CALCIUM SERPL-MCNC: 9.8 MG/DL (ref 8.6–10.4)
CHLORIDE BLD-SCNC: 97 MMOL/L (ref 98–107)
CHOLESTEROL/HDL RATIO: 3.7
CHOLESTEROL: 169 MG/DL
CO2: 26 MMOL/L (ref 20–31)
CREAT SERPL-MCNC: 0.8 MG/DL (ref 0.5–0.9)
DIFFERENTIAL TYPE: ABNORMAL
EOSINOPHILS RELATIVE PERCENT: 3 % (ref 1–4)
ESTIMATED AVERAGE GLUCOSE: 111 MG/DL
GFR AFRICAN AMERICAN: >60 ML/MIN
GFR NON-AFRICAN AMERICAN: >60 ML/MIN
GFR SERPL CREATININE-BSD FRML MDRD: ABNORMAL ML/MIN/{1.73_M2}
GFR SERPL CREATININE-BSD FRML MDRD: ABNORMAL ML/MIN/{1.73_M2}
GLUCOSE BLD-MCNC: 127 MG/DL (ref 70–99)
HBA1C MFR BLD: 5.5 % (ref 4.8–5.9)
HCT VFR BLD CALC: 45.4 % (ref 36.3–47.1)
HDLC SERPL-MCNC: 46 MG/DL
HEMOGLOBIN: 15.1 G/DL (ref 11.9–15.1)
IMMATURE GRANULOCYTES: 0 %
LDL CHOLESTEROL: 92 MG/DL (ref 0–130)
LYMPHOCYTES # BLD: 33 % (ref 24–43)
MCH RBC QN AUTO: 29.5 PG (ref 25.2–33.5)
MCHC RBC AUTO-ENTMCNC: 33.3 G/DL (ref 25.2–33.5)
MCV RBC AUTO: 88.7 FL (ref 82.6–102.9)
MONOCYTES # BLD: 7 % (ref 3–12)
NRBC AUTOMATED: 0 PER 100 WBC
PDW BLD-RTO: 13.3 % (ref 11.8–14.4)
PLATELET # BLD: 213 K/UL (ref 138–453)
PLATELET ESTIMATE: ABNORMAL
PMV BLD AUTO: 10.5 FL (ref 8.1–13.5)
POTASSIUM SERPL-SCNC: 4.4 MMOL/L (ref 3.7–5.3)
RBC # BLD: 5.12 M/UL (ref 3.95–5.11)
RBC # BLD: ABNORMAL 10*6/UL
SEG NEUTROPHILS: 56 % (ref 36–65)
SEGMENTED NEUTROPHILS ABSOLUTE COUNT: 3.98 K/UL (ref 1.5–8.1)
SODIUM BLD-SCNC: 137 MMOL/L (ref 135–144)
THYROXINE, FREE: 1.04 NG/DL (ref 0.93–1.7)
TOTAL PROTEIN: 7.5 G/DL (ref 6.4–8.3)
TRIGL SERPL-MCNC: 155 MG/DL
TSH SERPL DL<=0.05 MIU/L-ACNC: 1.48 MIU/L (ref 0.3–5)
VLDLC SERPL CALC-MCNC: ABNORMAL MG/DL (ref 1–30)
WBC # BLD: 7.1 K/UL (ref 3.5–11.3)
WBC # BLD: ABNORMAL 10*3/UL

## 2020-03-10 PROCEDURE — 80053 COMPREHEN METABOLIC PANEL: CPT

## 2020-03-10 PROCEDURE — 84443 ASSAY THYROID STIM HORMONE: CPT

## 2020-03-10 PROCEDURE — 83036 HEMOGLOBIN GLYCOSYLATED A1C: CPT

## 2020-03-10 PROCEDURE — 36415 COLL VENOUS BLD VENIPUNCTURE: CPT

## 2020-03-10 PROCEDURE — 85025 COMPLETE CBC W/AUTO DIFF WBC: CPT

## 2020-03-10 PROCEDURE — 84439 ASSAY OF FREE THYROXINE: CPT

## 2020-03-10 PROCEDURE — 80061 LIPID PANEL: CPT

## 2020-03-12 ENCOUNTER — OFFICE VISIT (OUTPATIENT)
Dept: INTERNAL MEDICINE | Age: 55
End: 2020-03-12
Payer: COMMERCIAL

## 2020-03-12 VITALS
HEART RATE: 72 BPM | WEIGHT: 201 LBS | HEIGHT: 64 IN | SYSTOLIC BLOOD PRESSURE: 130 MMHG | RESPIRATION RATE: 16 BRPM | DIASTOLIC BLOOD PRESSURE: 86 MMHG | BODY MASS INDEX: 34.31 KG/M2

## 2020-03-12 PROCEDURE — 99214 OFFICE O/P EST MOD 30 MIN: CPT | Performed by: INTERNAL MEDICINE

## 2020-03-12 SDOH — HEALTH STABILITY: MENTAL HEALTH: HOW OFTEN DO YOU HAVE A DRINK CONTAINING ALCOHOL?: 2-4 TIMES A MONTH

## 2020-03-12 SDOH — HEALTH STABILITY: MENTAL HEALTH: HOW MANY STANDARD DRINKS CONTAINING ALCOHOL DO YOU HAVE ON A TYPICAL DAY?: 1 OR 2

## 2020-03-12 ASSESSMENT — PATIENT HEALTH QUESTIONNAIRE - PHQ9
SUM OF ALL RESPONSES TO PHQ QUESTIONS 1-9: 0
SUM OF ALL RESPONSES TO PHQ QUESTIONS 1-9: 0
SUM OF ALL RESPONSES TO PHQ9 QUESTIONS 1 & 2: 0
2. FEELING DOWN, DEPRESSED OR HOPELESS: 0
1. LITTLE INTEREST OR PLEASURE IN DOING THINGS: 0

## 2020-03-12 NOTE — PATIENT INSTRUCTIONS
Patient Education        Learning About Diabetes Food Guidelines  Your Care Instructions    Meal planning is important to manage diabetes. It helps keep your blood sugar at a target level (which you set with your doctor). You don't have to eat special foods. You can eat what your family eats, including sweets once in a while. But you do have to pay attention to how often you eat and how much you eat of certain foods. You may want to work with a dietitian or a certified diabetes educator (CDE) to help you plan meals and snacks. A dietitian or CDE can also help you lose weight if that is one of your goals. What should you know about eating carbs? Managing the amount of carbohydrate (carbs) you eat is an important part of healthy meals when you have diabetes. Carbohydrate is found in many foods. · Learn which foods have carbs. And learn the amounts of carbs in different foods. ? Bread, cereal, pasta, and rice have about 15 grams of carbs in a serving. A serving is 1 slice of bread (1 ounce), ½ cup of cooked cereal, or 1/3 cup of cooked pasta or rice. ? Fruits have 15 grams of carbs in a serving. A serving is 1 small fresh fruit, such as an apple or orange; ½ of a banana; ½ cup of cooked or canned fruit; ½ cup of fruit juice; 1 cup of melon or raspberries; or 2 tablespoons of dried fruit. ? Milk and no-sugar-added yogurt have 15 grams of carbs in a serving. A serving is 1 cup of milk or 2/3 cup of no-sugar-added yogurt. ? Starchy vegetables have 15 grams of carbs in a serving. A serving is ½ cup of mashed potatoes or sweet potato; 1 cup winter squash; ½ of a small baked potato; ½ cup of cooked beans; or ½ cup cooked corn or green peas. · Learn how much carbs to eat each day and at each meal. A dietitian or CDE can teach you how to keep track of the amount of carbs you eat. This is called carbohydrate counting. · If you are not sure how to count carbohydrate grams, use the Plate Method to plan meals.  It is a good, quick way to make sure that you have a balanced meal. It also helps you spread carbs throughout the day. ? Divide your plate by types of foods. Put non-starchy vegetables on half the plate, meat or other protein food on one-quarter of the plate, and a grain or starchy vegetable in the final quarter of the plate. To this you can add a small piece of fruit and 1 cup of milk or yogurt, depending on how many carbs you are supposed to eat at a meal.  · Try to eat about the same amount of carbs at each meal. Do not \"save up\" your daily allowance of carbs to eat at one meal.  · Proteins have very little or no carbs per serving. Examples of proteins are beef, chicken, turkey, fish, eggs, tofu, cheese, cottage cheese, and peanut butter. A serving size of meat is 3 ounces, which is about the size of a deck of cards. Examples of meat substitute serving sizes (equal to 1 ounce of meat) are 1/4 cup of cottage cheese, 1 egg, 1 tablespoon of peanut butter, and ½ cup of tofu. How can you eat out and still eat healthy? · Learn to estimate the serving sizes of foods that have carbohydrate. If you measure food at home, it will be easier to estimate the amount in a serving of restaurant food. · If the meal you order has too much carbohydrate (such as potatoes, corn, or baked beans), ask to have a low-carbohydrate food instead. Ask for a salad or green vegetables. · If you use insulin, check your blood sugar before and after eating out to help you plan how much to eat in the future. · If you eat more carbohydrate at a meal than you had planned, take a walk or do other exercise. This will help lower your blood sugar. What else should you know? · Limit saturated fat, such as the fat from meat and dairy products. This is a healthy choice because people who have diabetes are at higher risk of heart disease. So choose lean cuts of meat and nonfat or low-fat dairy products.  Use olive or canola oil instead of butter or shortening when cooking. · Don't skip meals. Your blood sugar may drop too low if you skip meals and take insulin or certain medicines for diabetes. · Check with your doctor before you drink alcohol. Alcohol can cause your blood sugar to drop too low. Alcohol can also cause a bad reaction if you take certain diabetes medicines. Follow-up care is a key part of your treatment and safety. Be sure to make and go to all appointments, and call your doctor if you are having problems. It's also a good idea to know your test results and keep a list of the medicines you take. Where can you learn more? Go to https://chpepiceweb.DJTUNES.COM. org and sign in to your [employer] account. Enter S902 in the [employer] box to learn more about \"Learning About Diabetes Food Guidelines. \"     If you do not have an account, please click on the \"Sign Up Now\" link. Current as of: April 16, 2019  Content Version: 12.3  © 4117-3930 [employer]. Care instructions adapted under license by [facility] ([facility]). If you have questions about a medical condition or this instruction, always ask your healthcare professional. [address] any warranty or liability for your use of this information. Patient Education        Learning About Diabetes and Exercise  Can you exercise if you have diabetes? When you have diabetes, it's important to get regular exercise. This helps control your blood sugar level. You can still play sports, run, ride a bike, go swimming, and do other activities when you have diabetes. How can exercise help you manage diabetes? Your body turns the food you eat into glucose, a type of sugar. You need this sugar for energy. When you have diabetes, the sugar builds up in your blood. But when you exercise, your body uses sugar. This helps keep it from building up in your blood and results in lower blood sugar and better control of diabetes. Exercise may help you in other ways too.  It can exercise or up to 24 hours later. Some symptoms of low blood sugar, such as sweating, a fast heartbeat, or feeling tired, can be confused with what can happen anytime you exercise. Other symptoms may include feeling anxious, dizzy, weak, or shaky. So it's a good idea to check your blood sugar again. · You can treat low blood sugar by eating or drinking something that has 15 grams of carbohydrate. These should be quick-sugar foods. [place] Fast foods such as fruit juice, regular (not diet) soda, glucose tablets, hard candy, or raisins can help raise blood sugar. Check your blood sugar level again 15 minutes after having a quick-sugar food to make sure your level is getting back to your target range. · Drink plenty of water before, during, and after you exercise. · Wear medical alert jewelry that says you have diabetes. You can buy this at most drugstores. · Pay attention to your body. If you are used to exercise and notice that you can't do as much as usual, talk to your doctor. Follow-up care is a key part of your treatment and safety. Be sure to make and go to all appointments, and call your doctor if you are having problems. It's also a good idea to know your test results and keep a list of the medicines you take. Where can you learn more? Go to https://[employer].BankFacil. org and sign in to your [employer] account. Enter T953 in the [facility] box to learn more about \"Learning About Diabetes and Exercise. \"     If you do not have an account, please click on the \"Sign Up Now\" link. Current as of: April 16, 2019  Content Version: 12.3  © 4790-3795 [employer]. Care instructions adapted under license by [facility] ([facility]). If you have questions about a medical condition or this instruction, always ask your healthcare professional. [address] any warranty or liability for your use of this information.

## 2020-03-12 NOTE — PROGRESS NOTES
- epistaxis, nasal congestion, nasal discharge, sore throat, tinnitus or vertigo  Cardiovascular: negative for - chest pain, dyspnea on exertion or shortness of breath  Respiratory: negative for - cough, hemoptysis or wheezing  Gastrointestinal: negative for - constipation, diarrhea or nausea/vomiting  Genitourinary: negative for - dysuria, hematuria or nocturia  Musculoskeletal: positive for - joint pain  negative for - muscle pain or muscular weakness  Neurologic: negative for - gait disturbance, numbness/tingling, seizures or tremors  Psychiatric: negative for - anxiety or depression      HEMOGLOBIN A1c FROM CURRENT AND PRIOR VISIT:    Hemoglobin A1C   Date Value Ref Range Status   03/10/2020 5.5 4.8 - 5.9 % Final   09/10/2019 5.0 4.8 - 5.9 % Final        PHYSICAL EXAMINATION:    Wt Readings from Last 2 Encounters:   03/12/20 201 lb (91.2 kg)   09/12/19 200 lb 6.4 oz (90.9 kg)       Vitals:    03/12/20 0846   BP: 130/86   Site: Right Upper Arm   Position: Sitting   Cuff Size: Medium Adult   Pulse: 72   Resp: 16   Weight: 201 lb (91.2 kg)   Height: 5' 4\" (1.626 m)     Body mass index is 34.5 kg/m². General: This is a 47 y.o.  female who is alert and oriented to person, place and time. She appears to be her stated age and does not appear to be in any acute distress. Skin: Skin color, texture, turgor normal. No rashes or lesions. HEENT/Neck: Head: Normal, normocephalic, atraumatic. Eye: Normal external eye, conjunctiva, lids cornea, CELIA. Ears: Normal TM's bilaterally. Normal auditory canals and external ears. Non-tender. Nose: Normal external nose, mucus membranes and septum. Pharynx: Dental Hygiene adequate. Normal buccal mucosa. Normal pharynx. Neck / Thyroid: Supple, no masses, nodes, nodules or enlargement. Lungs: Normal - CTA without rales, rhonchi, or wheezing. Heart: regular rate and rhythm, S1, S2 normal, no murmur, click, rub or gallop No S3 or S4.   Abdomen: Obese, soft, non-distended, non-tender, normal active bowel sounds, no masses palpated and no hepatosplenomegaly  Extremities: There is no clubbing, cyanosis, or edema in any of the extremities. There was a firm swelling on the back of the right heel, measuring approximately 1.5-2 cm in diameter. It was not erythematous and non-tender. Neurologic:  cranial nerves II-XII are grossly intact  Osteopathic Structural Examination: Patient was examined in the seated and standing positions. There was full range of motion in the cervical, thoracic, and lumbar spines. No scoliosis. No change in thoracic kyphosis or lumbar lordosis. No increased paravertebral muscle tenderness. Diabetic foot check: Normal strength and range of motion of toes, feet, and ankles bilaterally. No joint deformity. No cyanosis or clubbing. 100% sensation at all 22 test points with the 10 gram filament. Dorsalis pedis pulses intact bilaterally. Capillary refill at the toes was less than 2 seconds. Vibratory sensation (with 128 Hz tuning fork) intact bilaterally. Light touch sensation intact bilaterally. Hair growth present on feet and toes bilaterally. No skin breakdown, erythema, rub spots, blisters, scaling, or ulcers. No calluses or corns. Toenails thin and not ingrown. No evidence of fungal infection. ASSESSMENT/PLAN:    1. Controlled type 2 diabetes mellitus without complication, without long-term current use of insulin ([facility])  - Her most recent HbA1c was 5.5%, which is lower than the target value of 6.5% or lower. We are not going to make changes to her diabetic medications. She was advised to continue to watch her diet and exercise to keep her diabetes under control.    - Basic Metabolic Panel, Fasting; Future  - Hemoglobin A1C; Future  -  DIABETES FOOT EXAM    2. Achilles tendon mass, new  - We will get an x-ray of her right foot and refer her to Dr. Mary Reese for further evaluation  - XR FOOT RIGHT (MIN 3 VIEWS);  Future  - Su Wilkinson DPM, Podiatry, [place]    3. Essential hypertension, controlled  - She will continue to take her antihypertensive medication     4. Hypothyroidism due to acquired atrophy of thyroid, stable  - She will continue to take Synthroid    5. Gastroesophageal reflux disease without esophagitis, stable  - We will continue to monitor     6. Class 1 obesity without serious comorbidity with body mass index (BMI) of 34.0 to 34.9 in adult, unspecified obesity type, stable  - We discussed weight loss  - She will continue to watch her diet and exercise       Orders Placed This Encounter   Procedures    XR FOOT RIGHT (MIN 3 VIEWS)     Standing Status:   Future     Standing Expiration Date:   3/12/2021     Order Specific Question:   Reason for exam:     Answer:   mass on back of right heel    Basic Metabolic Panel, Fasting     Standing Status:   Future     Standing Expiration Date:   3/12/2021    Hemoglobin A1C     Standing Status:   Future     Standing Expiration Date:   3/12/2021   [facility] SHAGGY Barajas, Podiatry, [place]     Referral Priority:   Routine     Referral Type:   Eval and Treat     Referral Reason:   Specialty Services Required     Referred to Provider:   Andrea Tripp DPM     Requested Specialty:   Podiatry     Number of Visits Requested:   1     DIABETES FOOT EXAM       Requested Prescriptions      No prescriptions requested or ordered in this encounter       Labs as ordered above. Return in about 6 months (around 9/12/2020).         Electronically signed by Everardo Leslie DO on 3/12/2020 at 9:26 AM  Internal Medicine

## 2020-03-12 NOTE — PROGRESS NOTES
Yessica received counseling on the following healthy behaviors: nutrition and exercise  Reviewed prior labs and health maintenance  Continue current medications, diet and exercise. Discussed use, benefit, and side effects of prescribed medications. Barriers to medication compliance addressed. Patient given educational materials - see patient instructions  Was a self-tracking handout given in paper form or via [employer]? Yes    Requested Prescriptions      No prescriptions requested or ordered in this encounter       All patient questions answered. Patient voiced understanding. Quality Measures    Body mass index is 34.5 kg/m². Elevated. Weight control plan discussed: Healthy diet and regular exercise. BP: 130/86 Blood pressure is normal. Treatment plan: See main progress note. Lab Results   Component Value Date    LDLCHOLESTEROL 92 03/10/2020    (goal LDL reduction with dx if diabetes is 50% LDL reduction)      PHQ Scores 3/12/2020 3/11/2019 9/11/2018 5/30/2018 1/5/2018 10/13/2016   PHQ2 Score 0 0 0 0 0 0   PHQ9 Score 0 0 0 0 0 0     See progress note for plan, if depression exists. Interpretation of Total Score: Major depression if the answer to questions 1 or 2 and 5 or more of questions 1 to 9 are at least [facility] than half the days. \"  Other depressive syndrome if questions 1 or 2 and two, three, or four of questions 1 to 9 are at least [facility] than half the days\"   Depression Severity: 5-9 = Mild depression, 10-14 = Moderate depression, 15-19 = Moderately severe depression, 20-27 = Severe depression

## 2020-03-13 ENCOUNTER — HOSPITAL ENCOUNTER (OUTPATIENT)
Dept: GENERAL RADIOLOGY | Age: 55
Discharge: HOME | End: 2020-03-15
Payer: COMMERCIAL

## 2020-03-13 PROCEDURE — 73630 X-RAY EXAM OF FOOT: CPT

## 2020-03-17 ENCOUNTER — OFFICE VISIT (OUTPATIENT)
Dept: PODIATRY | Age: 55
End: 2020-03-17
Payer: COMMERCIAL

## 2020-03-17 VITALS
HEIGHT: 64 IN | HEART RATE: 76 BPM | DIASTOLIC BLOOD PRESSURE: 80 MMHG | SYSTOLIC BLOOD PRESSURE: 134 MMHG | WEIGHT: 209 LBS | RESPIRATION RATE: 20 BRPM | BODY MASS INDEX: 35.68 KG/M2

## 2020-03-17 PROCEDURE — 99213 OFFICE O/P EST LOW 20 MIN: CPT | Performed by: PODIATRIST

## 2020-03-17 NOTE — PROGRESS NOTES
Subjective:  Rudy Flores is a 47 y.o. female who presents to the office today complaining of pain on the Right foot. Symptoms began 2 month(s) ago. History of injury: no.  Patient relates pain is Present. Pain is rated 1 out of 10 and is described as mild. Treatments prior to today's visit include: xrays. Currently denies F/C/N/V. No Known Allergies    Past Medical History:   Diagnosis Date    Allergic rhinitis     Cubital tunnel syndrome     Left arm, s/p ulnar nerve surgery    Dysmenorrhea     Elevated fasting glucose     Hypertension     Obesity     Orbital fracture 2011    (Blowout) - left eye.  Plantar fasciitis     Shingles 05/2018       Prior to Admission medications    Medication Sig Start Date End Date Taking? Authorizing Provider   Elastic Bandages & Supports (ANKLE SPLINT/NIGHT AIRFORM) [facility] 1 Device by Does not apply route every evening Equinus deformity of both feet  (primary encounter diagnosis)  Achilles tendinitis, right leg  Pes cavus  Calcaneal spur of foot, right      Dispense posterior night splint.  3/17/20  Yes Demian John DPM   metFORMIN (GLUCOPHAGE) 500 MG tablet TAKE 1 TABLET BY MOUTH ONE TIME DAILY 2/26/20  Yes Edgardo Jordan DO   levothyroxine (SYNTHROID) 75 MCG tablet TAKE 1/2 TABLET ONE TIME DAILY 4/18/19  Yes Edgardo Jordan DO   lisinopril-hydrochlorothiazide (PRINZIDE;ZESTORETIC) 10-12.5 MG per tablet TAKE ONE TABLET BY MOUTH ONE TIME A DAY 4/18/19  Yes Wash Olszewski, DO       Past Surgical History:   Procedure Laterality Date    BREAST SURGERY Right 2002    surgery for benign bleeding    [address], LAPAROSCOPIC  [facility]/07/2017    [facility]     COLONOSCOPY  2/12/16    hyperplastic polyp  Dr. Tiffany Santacruz Left 8/7/2012    Dr. Katlin Yeager, cubital tunnel release (ulnar nerve decompression)    ENDOMETRIAL ABLATION  2014    for menorrhagia    SKIN BIOPSY Left 5/1/2006    Left upper arm, barefoot. X rays reviewed with the pt in detail. All questions answered. Contact office with any questions/problems/concerns. RTC in 3 week(s).

## 2020-05-14 RX ORDER — LEVOTHYROXINE SODIUM 0.07 MG/1
TABLET ORAL
Qty: 45 TABLET | Refills: 3 | Status: SHIPPED | OUTPATIENT
Start: 2020-05-14 | End: 2022-03-21

## 2020-05-14 RX ORDER — LISINOPRIL AND HYDROCHLOROTHIAZIDE 12.5; 1 MG/1; MG/1
TABLET ORAL
Qty: 90 TABLET | Refills: 3 | Status: SHIPPED | OUTPATIENT
Start: 2020-05-14 | End: 2021-05-26

## 2020-07-31 ENCOUNTER — HOSPITAL ENCOUNTER (OUTPATIENT)
Dept: MAMMOGRAPHY | Age: 55
Discharge: HOME | End: 2020-08-02
Payer: COMMERCIAL

## 2020-07-31 PROCEDURE — 77063 BREAST TOMOSYNTHESIS BI: CPT

## 2020-08-10 ENCOUNTER — HOSPITAL ENCOUNTER (OUTPATIENT)
Dept: ULTRASOUND IMAGING | Age: 55
Discharge: HOME | End: 2020-08-12
Payer: COMMERCIAL

## 2020-08-10 ENCOUNTER — HOSPITAL ENCOUNTER (OUTPATIENT)
Dept: MAMMOGRAPHY | Age: 55
Discharge: HOME | End: 2020-08-12
Payer: COMMERCIAL

## 2020-08-10 PROCEDURE — G0279 TOMOSYNTHESIS, MAMMO: HCPCS

## 2020-08-10 PROCEDURE — 76642 ULTRASOUND BREAST LIMITED: CPT

## 2020-09-11 ENCOUNTER — OFFICE VISIT (OUTPATIENT)
Dept: INTERNAL MEDICINE | Age: 55
End: 2020-09-11
Payer: COMMERCIAL

## 2020-09-11 VITALS
SYSTOLIC BLOOD PRESSURE: 120 MMHG | RESPIRATION RATE: 16 BRPM | BODY MASS INDEX: 36.19 KG/M2 | HEIGHT: 64 IN | HEART RATE: 76 BPM | DIASTOLIC BLOOD PRESSURE: 82 MMHG | WEIGHT: 212 LBS

## 2020-09-11 PROCEDURE — 99214 OFFICE O/P EST MOD 30 MIN: CPT | Performed by: INTERNAL MEDICINE

## 2020-09-11 RX ORDER — OMEPRAZOLE 20 MG/1
20 CAPSULE, DELAYED RELEASE ORAL 2 TIMES DAILY
Qty: 30 CAPSULE | Refills: 11 | Status: SHIPPED
Start: 2020-09-11 | End: 2021-03-18 | Stop reason: DRUGHIGH

## 2020-09-11 SDOH — ECONOMIC STABILITY: FOOD INSECURITY: WITHIN THE PAST 12 MONTHS, YOU WORRIED THAT YOUR FOOD WOULD RUN OUT BEFORE YOU GOT MONEY TO BUY MORE.: NEVER TRUE

## 2020-09-11 SDOH — ECONOMIC STABILITY: TRANSPORTATION INSECURITY
IN THE PAST 12 MONTHS, HAS THE LACK OF TRANSPORTATION KEPT YOU FROM MEDICAL APPOINTMENTS OR FROM GETTING MEDICATIONS?: NO

## 2020-09-11 SDOH — ECONOMIC STABILITY: INCOME INSECURITY: HOW HARD IS IT FOR YOU TO PAY FOR THE VERY BASICS LIKE FOOD, HOUSING, MEDICAL CARE, AND HEATING?: NOT HARD AT ALL

## 2020-09-11 SDOH — ECONOMIC STABILITY: TRANSPORTATION INSECURITY
IN THE PAST 12 MONTHS, HAS LACK OF TRANSPORTATION KEPT YOU FROM MEETINGS, WORK, OR FROM GETTING THINGS NEEDED FOR DAILY LIVING?: NO

## 2020-09-11 SDOH — ECONOMIC STABILITY: FOOD INSECURITY: WITHIN THE PAST 12 MONTHS, THE FOOD YOU BOUGHT JUST DIDN'T LAST AND YOU DIDN'T HAVE MONEY TO GET MORE.: NEVER TRUE

## 2020-09-11 NOTE — PROGRESS NOTES
DR. Cassia Claudio - PROGRESS NOTE    CHIEF COMPLAINT/HISTORY OF CHIEF COMPLAINT: This 54 y.o.  female comes in today for ongoing evaluation and management of her diabetes mellitus type 2, hypertension, hypothyroidism, gastroesophageal reflux disease, and obesity. She did not get her blood work done for today because she forgot. She is having increased heartburn again. She thinks it might be because she has gained some weight. She used to take Prilosec and was wondering if she could go back on it. She has been trying to watch her diet and maintain regular physical activity in the form of walking to try to keep her diabetes under control. She takes metformin for her diabetes. She denies any major hyper- or hypoglycemic episodes. She takes Synthroid for hypothyroidism, which has been stable. She has not had any fatigue or temperature intolerance. Her gastroesophageal reflux disease has been stable without medication. She is on lisinopril/hydrochlorothiazide for hypertension. She denies any chest pain or dizziness. Otherwise she seems to be doing fairly well and denies any other complaints. ALLERGIES/INTOLERANCES: No Known Allergies    MEDICATIONS:   Outpatient Medications Marked as Taking for the 9/11/20 encounter (Office Visit) with Iris Anthony, DO   Medication Sig Dispense Refill    levothyroxine (SYNTHROID) 75 MCG tablet TAKE 1/2 TAKE BY MOUTH DAILY 45 tablet 3    lisinopril-hydroCHLOROthiazide (PRINZIDE;ZESTORETIC) 10-12.5 MG per tablet TAKE 1 TABLET BY MOUTH ONE TIME A DAY 90 tablet 3    metFORMIN (GLUCOPHAGE) 500 MG tablet TAKE 1 TABLET BY MOUTH ONE TIME DAILY 90 tablet 3       IMMUNIZATIONS: Reviewed for influenza and pneumococcal status as indicated in electronic record.     REVIEW OF SYSTEMS:     General: negative for - chills, fever or night sweats  Skin: negative for - pruritus or rash  Head: Negative for: headache or recent history of head trauma  Ear, Nose, Throat: negative for - epistaxis, nasal congestion, nasal discharge, sore throat, tinnitus or vertigo  Cardiovascular: negative for - chest pain, dyspnea on exertion or shortness of breath  Respiratory: negative for - cough, hemoptysis or wheezing  Gastrointestinal: positive for - heartburn  negative for - constipation, diarrhea or nausea/vomiting  Genitourinary: negative for - dysuria, hematuria or nocturia  Musculoskeletal: positive for - joint pain  negative for - muscle pain or muscular weakness  Neurologic: negative for - gait disturbance, numbness/tingling, seizures or tremors  Psychiatric: negative for - anxiety or depression    HEMOGLOBIN A1c FROM CURRENT AND PRIOR VISIT:    Hemoglobin A1C   Date Value Ref Range Status   03/10/2020 5.5 4.8 - 5.9 % Final   09/10/2019 5.0 4.8 - 5.9 % Final        PHYSICAL EXAMINATION:    Wt Readings from Last 2 Encounters:   09/11/20 212 lb (96.2 kg)   03/17/20 209 lb (94.8 kg)       Vitals:    09/11/20 0840   BP: 120/82   Site: Right Upper Arm   Position: Sitting   Cuff Size: Large Adult   Pulse: 76   Resp: 16   Weight: 212 lb (96.2 kg)   Height: 5' 4\" (1.626 m)     Body mass index is 36.39 kg/m². General: This is a 54 y.o.  female who is alert and oriented to person, place and time. She appears to be her stated age and does not appear to be in any acute distress. Skin: Skin color, texture, turgor normal. No rashes or lesions. HEENT/Neck: Head: Normal, normocephalic, atraumatic. Eye: Normal external eye, conjunctiva, lids cornea, CELIA. Ears: Normal TM's bilaterally. Normal auditory canals and external ears. Non-tender. Nose: Normal external nose, mucus membranes and septum. Pharynx: Dental Hygiene adequate. Normal buccal mucosa. Normal pharynx. Neck / Thyroid: Supple, no masses, nodes, nodules or enlargement. Lungs: Normal - CTA without rales, rhonchi, or wheezing. Heart: regular rate and rhythm, S1, S2 normal, no murmur, click, rub or gallop No S3 or S4.   Abdomen: Obese, soft, non-distended, non-tender, normal active bowel sounds, no masses palpated and no hepatosplenomegaly  Extremities: There is no clubbing, cyanosis, or edema in any of the extremities. Neurologic:  cranial nerves II-XII are grossly intact  Osteopathic Structural Examination: Patient was examined in the seated and standing positions. There was full range of motion in the cervical, thoracic, and lumbar spines. No scoliosis. No change in thoracic kyphosis or lumbar lordosis. No increased paravertebral muscle tenderness. Diabetic foot check: Normal strength and range of motion of toes, feet, and ankles bilaterally. No joint deformity. No cyanosis or clubbing. 100% sensation at all 22 test points with the 10 gram filament. Dorsalis pedis pulses intact bilaterally. Capillary refill at the toes was less than 2 seconds. Vibratory sensation (with 128 Hz tuning fork) intact bilaterally. Light touch sensation intact bilaterally. Hair growth present on feet and toes bilaterally. No skin breakdown, erythema, rub spots, blisters, scaling, or ulcers. No corns. There was a callus on the left heel. Toenails thin and not ingrown. No evidence of fungal infection. ASSESSMENT/PLAN:    1. Controlled type 2 diabetes mellitus without complication, without long-term current use of insulin ([address]  - We do not have a recent HbA1c so we are not going to make changes to her diabetic medications. She was advised to continue to watch her diet and exercise to keep her diabetes under control.    - Basic Metabolic Panel, Fasting; Future  - Hemoglobin A1C; Future  -  DIABETES FOOT EXAM    2. Gastroesophageal reflux disease without esophagitis, worse  - This is starting to get worse again so we will put her back on Prilosec 20 mg daily    3. Essential hypertension, controlled  - She will continue to take her antihypertensive medication     4. Hypothyroidism due to acquired atrophy of thyroid, stable  - She will continue to take Synthroid    5. Class 2 obesity without serious comorbidity with body mass index (BMI) of 36.0 to 36.9 in adult, unspecified obesity type, worse  - We discussed weight loss  - She will continue to watch her diet and exercise       Orders Placed This Encounter   Procedures    Basic Metabolic Panel, Fasting     Standing Status:   Future     Standing Expiration Date:   9/11/2021    Hemoglobin A1C     Standing Status:   Future     Standing Expiration Date:   9/11/2021     DIABETES FOOT EXAM       Requested Prescriptions     Signed Prescriptions Disp Refills    omeprazole (PRILOSEC) 20 MG delayed release capsule 30 capsule 11     Sig: Take 1 capsule by mouth 2 times daily       Labs and medications as ordered above. Return in about 3 months (around 12/11/2020).         Electronically signed by Miguel Gregorio DO on 9/11/2020 at 9:12 AM  Internal Medicine

## 2020-09-11 NOTE — PATIENT INSTRUCTIONS
Patient Education        Learning About Diabetes Food Guidelines  Your Care Instructions     Meal planning is important to manage diabetes. It helps keep your blood sugar at a target level (which you set with your doctor). You don't have to eat special foods. You can eat what your family eats, including sweets once in a while. But you do have to pay attention to how often you eat and how much you eat of certain foods. You may want to work with a dietitian or a certified diabetes educator (CDE) to help you plan meals and snacks. A dietitian or CDE can also help you lose weight if that is one of your goals. What should you know about eating carbs? Managing the amount of carbohydrate (carbs) you eat is an important part of healthy meals when you have diabetes. Carbohydrate is found in many foods. · Learn which foods have carbs. And learn the amounts of carbs in different foods. ? Bread, cereal, pasta, and rice have about 15 grams of carbs in a serving. A serving is 1 slice of bread (1 ounce), ½ cup of cooked cereal, or 1/3 cup of cooked pasta or rice. ? Fruits have 15 grams of carbs in a serving. A serving is 1 small fresh fruit, such as an apple or orange; ½ of a banana; ½ cup of cooked or canned fruit; ½ cup of fruit juice; 1 cup of melon or raspberries; or 2 tablespoons of dried fruit. ? Milk and no-sugar-added yogurt have 15 grams of carbs in a serving. A serving is 1 cup of milk or 2/3 cup of no-sugar-added yogurt. ? Starchy vegetables have 15 grams of carbs in a serving. A serving is ½ cup of mashed potatoes or sweet potato; 1 cup winter squash; ½ of a small baked potato; ½ cup of cooked beans; or ½ cup cooked corn or green peas. · Learn how much carbs to eat each day and at each meal. A dietitian or CDE can teach you how to keep track of the amount of carbs you eat. This is called carbohydrate counting. · If you are not sure how to count carbohydrate grams, use the Plate Method to plan meals.  It is a good, quick way to make sure that you have a balanced meal. It also helps you spread carbs throughout the day. ? Divide your plate by types of foods. Put non-starchy vegetables on half the plate, meat or other protein food on one-quarter of the plate, and a grain or starchy vegetable in the final quarter of the plate. To this you can add a small piece of fruit and 1 cup of milk or yogurt, depending on how many carbs you are supposed to eat at a meal.  · Try to eat about the same amount of carbs at each meal. Do not \"save up\" your daily allowance of carbs to eat at one meal.  · Proteins have very little or no carbs per serving. Examples of proteins are beef, chicken, turkey, fish, eggs, tofu, cheese, cottage cheese, and peanut butter. A serving size of meat is 3 ounces, which is about the size of a deck of cards. Examples of meat substitute serving sizes (equal to 1 ounce of meat) are 1/4 cup of cottage cheese, 1 egg, 1 tablespoon of peanut butter, and ½ cup of tofu. How can you eat out and still eat healthy? · Learn to estimate the serving sizes of foods that have carbohydrate. If you measure food at home, it will be easier to estimate the amount in a serving of restaurant food. · If the meal you order has too much carbohydrate (such as potatoes, corn, or baked beans), ask to have a low-carbohydrate food instead. Ask for a salad or green vegetables. · If you use insulin, check your blood sugar before and after eating out to help you plan how much to eat in the future. · If you eat more carbohydrate at a meal than you had planned, take a walk or do other exercise. This will help lower your blood sugar. What else should you know? · Limit saturated fat, such as the fat from meat and dairy products. This is a healthy choice because people who have diabetes are at higher risk of heart disease. So choose lean cuts of meat and nonfat or low-fat dairy products.  Use olive or canola oil instead of butter or shortening blood sugar during exercise or up to 24 hours later. Some symptoms of low blood sugar, such as sweating, a fast heartbeat, or feeling tired, can be confused with what can happen anytime you exercise. Other symptoms may include feeling anxious, dizzy, weak, or shaky. So it's a good idea to check your blood sugar again. · You can treat low blood sugar by eating or drinking something that has 15 grams of carbohydrate. These should be quick-sugar foods. Quick-sugar foods such as glucose tablets, table sugar, honey, fruit juice, regular (not diet) soda pop, or hard candy can help raise blood sugar. Check your blood sugar level again 15 minutes after having a quick-sugar food to make sure your level is getting back to your target range. · Drink plenty of water before, during, and after you exercise. · Wear medical alert jewelry that says you have diabetes. You can buy this at most drugstores. · Pay attention to your body. If you are used to exercise and notice that you can't do as much as usual, talk to your doctor. Follow-up care is a key part of your treatment and safety. Be sure to make and go to all appointments, and call your doctor if you are having problems. It's also a good idea to know your test results and keep a list of the medicines you take. Where can you learn more? Go to [email or web address]://[email or web address].Thanx. org and sign in to your [employer] account. Enter P490 in the [employer] box to learn more about \"Learning About Diabetes and Exercise. \"     If you do not have an account, please click on the \"Sign Up Now\" link. Current as of: December 20, 2019               Content Version: 12.5  © 4037-1148 Healthwise, Incorporated. Care instructions adapted under license by [facility] ([facility]). If you have questions about a medical condition or this instruction, always ask your healthcare professional. [address] any warranty or liability for your use of this information. Patient Education        omeprazole  Pronunciation:  oh MEP ra zol  Brand:  FIRST Omeprazole, Omeprazole + SyrSpend SF Pari, PriLOSEC, PriLOSEC OTC  What is the most important information I should know about omeprazole? Omeprazole can cause kidney problems. Tell your doctor if you are urinating less than usual, or if you have blood in your urine. Diarrhea may be a sign of a new infection. Call your doctor if you have diarrhea that is watery or has blood in it. Omeprazole may cause new or worsening symptoms of lupus. Tell your doctor if you have joint pain and a skin rash on your cheeks or arms that worsens in sunlight. You may be more likely to have a broken bone while taking this medicine long term or more than once per day. What is omeprazole? Omeprazole is a proton pump inhibitor that decreases the amount of acid produced in the stomach. Omeprazole is used to treat symptoms of gastroesophageal reflux disease (GERD) and other conditions caused by excess stomach acid. Omeprazole is also used to promote healing of erosive esophagitis (damage to your esophagus caused by stomach acid). Omeprazole may also be given together with antibiotics to treat gastric ulcer caused by infection with Helicobacter pylori (H. pylori). Over-the-counter (OTC) omeprazole is used in adults to help control heartburn that occurs 2 or more days per week. This medicine not for immediate relief of heartburn symptoms. OTC omeprazole must be taken on a regular basis for 14 days in a row. Omeprazole may also be used for purposes not listed in this medication guide. What should I discuss with my healthcare provider before taking omeprazole? Heartburn can mimic early symptoms of a heart attack. Get emergency medical help if you have chest pain that spreads to your jaw or shoulder and you feel sweaty or light-headed.   You should not use omeprazole if you are allergic to it, or if:  · you are also allergic to medicines like omeprazole, such as esomeprazole, lansoprazole, pantoprazole, rabeprazole, Nexium, Prevacid, Protonix, and others; or  · you also take HIV medication that contains rilpivirine (such as Eloise Kirkpatrick). Ask a doctor or pharmacist if this medicine is safe to use if you have:  · trouble or pain with swallowing;  · bloody or black stools, vomit that looks like blood or coffee grounds;  · heartburn that has lasted for over 3 months;  · frequent chest pain, heartburn with wheezing;  · unexplained weight loss;  · nausea or vomiting, stomach pain;  · liver disease;  · low levels of magnesium in your blood; or  · osteoporosis or low bone mineral density (osteopenia). You may be more likely to have a broken bone in your hip, wrist, or spine while taking a proton pump inhibitor long-term or more than once per day. Talk with your doctor about ways to keep your bones healthy. Ask a doctor before using this medicine if you are pregnant or breast-feeding. Do not give this medicine to a child without medical advice. How should I take omeprazole? Follow all directions on your prescription label and read all medication guides or instruction sheets. Use the medicine exactly as directed. Use Prilosec OTC (over-the-counter) exactly as directed on the label, or as prescribed by your doctor. Read and carefully follow any Instructions for Use provided with your medicine. Ask your doctor or pharmacist if you do not understand these instructions. Shake the oral suspension (liquid) before you measure a dose. Use the dosing syringe provided, or use a medicine dose-measuring device (not a kitchen spoon). If you cannot swallow a capsule whole, open it and sprinkle the medicine into a spoonful of applesauce. Swallow the mixture right away without chewing. Do not save it for later use. You must dissolve omeprazole powder in a small amount of water.  This mixture can either be swallowed or given through a nasogastric (NG) feeding tube using a catheter-tipped syringe. Use this medicine for the full prescribed length of time, even if your symptoms quickly improve. OTC omeprazole should be taken for only 14 days in a row. It may take 1 to 4 days before your symptoms improve. Allow at least 4 months to pass before you start a new 14-day course of treatment. Call your doctor if your symptoms do not improve, or if they get worse. Some conditions are treated with a combination of omeprazole and antibiotics. Use all medications as directed. This medicine can affect the results of certain medical tests. Tell any doctor who treats you that you are using omeprazole. Store at room temperature away from moisture and heat. What happens if I miss a dose? Take the medicine as soon as you can, but skip the missed dose if it is almost time for your next dose. Do not take two doses at one time. What happens if I overdose? Seek emergency medical attention or call the Poison Help line at 1-262.675.5466. What should I avoid while taking omeprazole? This medicine can cause diarrhea, which may be a sign of a new infection. If you have diarrhea that is watery or bloody, call your doctor before using anti-diarrhea medicine. What are the possible side effects of omeprazole? Get emergency medical help if you have signs of an allergic reaction: hives; difficulty breathing; swelling of your face, lips, tongue, or throat.   Stop using omeprazole and call your doctor at once if you have:  · severe stomach pain, diarrhea that is watery or bloody;  · new or unusual pain in your wrist, thigh, hip, or back;  · seizure (convulsions);  · kidney problems --little or no urination, blood in your urine, swelling, rapid weight gain;  · low magnesium --dizziness, irregular heartbeats, feeling jittery, muscle cramps, muscle spasms, cough or choking feeling; or  · new or worsening symptoms of lupus --joint pain, and a skin rash on your cheeks or arms that worsens in sunlight. Taking omeprazole long-term may cause you to develop stomach growths called fundic gland polyps. Talk with your doctor about this risk. If you use omeprazole for longer than 3 years, you could develop a vitamin B-12 deficiency. Talk to your doctor about how to manage this condition if you develop it. Common side effects may include:  · stomach pain, gas;  · nausea, vomiting, diarrhea; or  · headache. This is not a complete list of side effects and others may occur. Call your doctor for medical advice about side effects. You may report side effects to FDA at 7-894-FDA-2683. What other drugs will affect omeprazole? Sometimes it is not safe to use certain medications at the same time. Some drugs can affect your blood levels of other drugs you take, which may increase side effects or make the medications less effective. Tell your doctor about all your current medicines. Many drugs can affect omeprazole, especially:  · clopidogrel;  · methotrexate;  · Yuri's wort; or  · an antibiotic --amoxicillin, clarithromycin, rifampin. This list is not complete and many other drugs may affect omeprazole. This includes prescription and over-the-counter medicines, vitamins, and herbal products. Not all possible drug interactions are listed here. Where can I get more information? Your pharmacist can provide more information about omeprazole. Remember, keep this and all other medicines out of the reach of children, never share your medicines with others, and use this medication only for the indication prescribed. Every effort has been made to ensure that the information provided by [address] is accurate, up-to-date, and complete, but no guarantee is made to that effect. Drug information contained herein may be time sensitive.  [facility] information has been compiled for use by healthcare practitioners and consumers in the [place] and therefore [facility] does not warrant that uses outside of the [place] are appropriate, unless specifically indicated otherwise. [facility] drug information does not endorse drugs, diagnose patients or recommend therapy. [facility] drug information is an informational resource designed to assist licensed healthcare practitioners in caring for their patients and/or to serve consumers viewing this service as a supplement to, and not a substitute for, the expertise, skill, knowledge and judgment of healthcare practitioners. The absence of a warning for a given drug or drug combination in no way should be construed to indicate that the drug or drug combination is safe, effective or appropriate for any given patient. [facility] does not assume any responsibility for any aspect of healthcare administered with the aid of information [facility] provides. The information contained herein is not intended to cover all possible uses, directions, precautions, warnings, drug interactions, allergic reactions, or adverse effects. If you have questions about the drugs you are taking, check with your doctor, nurse or pharmacist.  Copyright 8403-3087 [address]: .01. Revision date: 4/11/2019. Care instructions adapted under license by [facility] ([facility]). If you have questions about a medical condition or this instruction, always ask your healthcare professional. [place] [address] any warranty or liability for your use of this information.

## 2020-09-11 NOTE — PROGRESS NOTES
Yessica received counseling on the following healthy behaviors: nutrition and exercise  Reviewed prior labs and health maintenance  Continue current medications, diet and exercise. Discussed use, benefit, and side effects of prescribed medications. Barriers to medication compliance addressed. Patient given educational materials - see patient instructions  Was a self-tracking handout given in paper form or via [employer]? Yes    Requested Prescriptions     Signed Prescriptions Disp Refills    omeprazole (PRILOSEC) 20 MG delayed release capsule 30 capsule 11     Sig: Take 1 capsule by mouth 2 times daily       All patient questions answered. Patient voiced understanding. Quality Measures    Body mass index is 36.39 kg/m². Elevated. Weight control plan discussed: Healthy diet and regular exercise. BP: 120/82 Blood pressure is normal. Treatment plan: See main progress note. Lab Results   Component Value Date    LDLCHOLESTEROL 92 03/10/2020    (goal LDL reduction with dx if diabetes is 50% LDL reduction)      PHQ Scores 3/12/2020 3/11/2019 9/11/2018 5/30/2018 1/5/2018 10/13/2016   PHQ2 Score 0 0 0 0 0 0   PHQ9 Score 0 0 0 0 0 0     See progress note for plan, if depression exists. Interpretation of Total Score: Major depression if the answer to questions 1 or 2 and 5 or more of questions 1 to 9 are at least [facility] than half the days. \"  Other depressive syndrome if questions 1 or 2 and two, three, or four of questions 1 to 9 are at least [facility] than half the days\"   Depression Severity: 5-9 = Mild depression, 10-14 = Moderate depression, 15-19 = Moderately severe depression, 20-27 = Severe depression

## 2020-11-13 ENCOUNTER — VIRTUAL VISIT (OUTPATIENT)
Dept: INTERNAL MEDICINE | Age: 55
End: 2020-11-13
Payer: COMMERCIAL

## 2020-11-13 VITALS — WEIGHT: 215 LBS | BODY MASS INDEX: 36.7 KG/M2 | HEIGHT: 64 IN

## 2020-11-13 PROCEDURE — 99212 OFFICE O/P EST SF 10 MIN: CPT | Performed by: INTERNAL MEDICINE

## 2020-11-13 NOTE — PROGRESS NOTES
DR. Prashant España - TELEHEALTH PROGRESS NOTE    CHIEF COMPLAINT/HISTORY OF CHIEF COMPLAINT: This 54 y.o.  female presents via TeleHealth visit today to get a return to work slip. She stayed home on 11/10 because she was having chills, headache, and a sore throat. She was tested for COVID-19 on 11/11/20 and the results came back negative today. She feels well now, except for a slight headache. The sore throat and chills are gone. There are no other complaints. ALLERGIES/INTOLERANCES: No Known Allergies    MEDICATIONS:   Outpatient Medications Marked as Taking for the 11/13/20 encounter (Virtual Visit) with Altagracia Wu, DO   Medication Sig Dispense Refill    omeprazole (PRILOSEC) 20 MG delayed release capsule Take 1 capsule by mouth 2 times daily (Patient taking differently: Take 20 mg by mouth 2 times daily Taking just once a day) 30 capsule 11    levothyroxine (SYNTHROID) 75 MCG tablet TAKE 1/2 TAKE BY MOUTH DAILY 45 tablet 3    lisinopril-hydroCHLOROthiazide (PRINZIDE;ZESTORETIC) 10-12.5 MG per tablet TAKE 1 TABLET BY MOUTH ONE TIME A DAY 90 tablet 3    metFORMIN (GLUCOPHAGE) 500 MG tablet TAKE 1 TABLET BY MOUTH ONE TIME DAILY 90 tablet 3       IMMUNIZATIONS: Reviewed for influenza and pneumococcal status as indicated in electronic record. REVIEW OF SYSTEMS:     Please see history of chief complaint above; otherwise no new problems with respect to General, HEENT, Cardiovascular, Respiratory, Gastrointestinal, Genitourinary, Endocrinologic, Musculoskeletal, or Neuropsychiatric complaints. PHYSICAL EXAMINATION:    (Due to this being a TeleHealth encounter, evaluation of the following organ systems is limited: Vitals/General/Skin/HEENT/Lungs/Heart/Abdomen/Genitourinary/Musculoskeletal/Neurologic.     Wt Readings from Last 2 Encounters:   11/13/20 215 lb (97.5 kg)   09/11/20 212 lb (96.2 kg)       Vitals:    11/13/20 1505   Weight: 215 lb (97.5 kg)   Height: 5' 4\" (1.626 m)     Body mass index is 36.9 kg/m². General: This is a 54 y.o.  female who is alert and oriented to person, place and time. She appears to be her stated age and does not appear to be in any acute distress. She was able to follow commands. Affect appropriate for the situation. There were no hallucinations. Skin: Skin color, texture, turgor appears normal. No apparent rashes or lesions. HEENT/Neck: Head: Normal, normocephalic, atraumatic  Eye: Normal appearing external eye, conjunctiva, lids cornea. EOM appear intact bilaterally. Ears: Normal appearing external ears. Nose: Normal appearing external nose. No discharge. Pharynx: Mucous membranes appear moist.  Neck: No masses visualized. Pulmonary/Chest: Chest rises and falls symmetrically with inspiration and expiration. No accessory muscle use noted. Normal respiratory effort. No signs of difficulty breathing or respiratory distress visualized. Abdomen: Obese  Musculoskeletal: Normal appearing range of motion of neck. Extremities: Within the limitations of TeleHealth examination there does not appear to be any  clubbing, cyanosis, or edema in any of the extremities. Neurologic: No gaze palsy. No facial asymmetry (Cranial Nerve 7 motor function). Exam limited due to video visit. Osteopathic Structural Examination: Unable to perform due to limitations of Telehealth. ASSESSMENT/PLAN:    1. COVID-19 ruled out by laboratory testing  - She may return to work  - We will write the letter she needs      No orders of the defined types were placed in this encounter. Requested Prescriptions      No prescriptions requested or ordered in this encounter       She will return here as previously scheduled or sooner if needed.       Pursuant to the emergency declaration under the [facility], [address] [place] [address] and the [employer] and [employer] Act, this TeleHealth visit was conducted, with patient's consent, to reduce the patient's risk of exposure to COVID-19 and provide continuity of care for an established patient. Services were provided through a video synchronous discussion virtually (using [email or web address] me) to substitute for in-person clinic visit. The originating site was the patient's home and the distant site was the provider's office. Patient's identity was verified via name and date of birth. Total time spent on the encounter was 15 minutes.           Electronically signed by Lauren Mena DO on 11/13/2020 at 3:12 PM  Internal Medicine

## 2021-01-17 ENCOUNTER — HOSPITAL ENCOUNTER (OUTPATIENT)
Dept: GENERAL RADIOLOGY | Age: 56
Discharge: HOME | End: 2021-01-19
Payer: COMMERCIAL

## 2021-01-17 ENCOUNTER — OFFICE VISIT (OUTPATIENT)
Dept: PRIMARY CARE CLINIC | Age: 56
End: 2021-01-17
Payer: COMMERCIAL

## 2021-01-17 ENCOUNTER — HOSPITAL ENCOUNTER (OUTPATIENT)
Age: 56
Discharge: HOME | End: 2021-01-19
Payer: COMMERCIAL

## 2021-01-17 VITALS
DIASTOLIC BLOOD PRESSURE: 80 MMHG | RESPIRATION RATE: 16 BRPM | OXYGEN SATURATION: 97 % | TEMPERATURE: 98.3 F | SYSTOLIC BLOOD PRESSURE: 150 MMHG | HEART RATE: 97 BPM | BODY MASS INDEX: 36.88 KG/M2 | HEIGHT: 64 IN | WEIGHT: 216 LBS

## 2021-01-17 DIAGNOSIS — M25.532 ACUTE PAIN OF LEFT WRIST: ICD-10-CM

## 2021-01-17 DIAGNOSIS — S52.502A CLOSED FRACTURE OF DISTAL END OF LEFT RADIUS, UNSPECIFIED FRACTURE MORPHOLOGY, INITIAL ENCOUNTER: Primary | ICD-10-CM

## 2021-01-17 DIAGNOSIS — S52.615A CLOSED NONDISPLACED FRACTURE OF STYLOID PROCESS OF LEFT ULNA, INITIAL ENCOUNTER: ICD-10-CM

## 2021-01-17 PROCEDURE — 99214 OFFICE O/P EST MOD 30 MIN: CPT | Performed by: FAMILY MEDICINE

## 2021-01-17 PROCEDURE — A6449 LT COMPRES BAND >=3" <5"/YD: HCPCS | Performed by: FAMILY MEDICINE

## 2021-01-17 PROCEDURE — 73110 X-RAY EXAM OF WRIST: CPT

## 2021-01-17 ASSESSMENT — PATIENT HEALTH QUESTIONNAIRE - PHQ9: DEPRESSION UNABLE TO ASSESS: PT REFUSES

## 2021-01-18 ENCOUNTER — OFFICE VISIT (OUTPATIENT)
Dept: ORTHOPEDIC SURGERY | Age: 56
End: 2021-01-18
Payer: COMMERCIAL

## 2021-01-18 ENCOUNTER — HOSPITAL ENCOUNTER (OUTPATIENT)
Dept: NON INVASIVE DIAGNOSTICS | Age: 56
Discharge: HOME | End: 2021-01-18
Payer: COMMERCIAL

## 2021-01-18 ENCOUNTER — HOSPITAL ENCOUNTER (OUTPATIENT)
Dept: LAB | Age: 56
Discharge: HOME | End: 2021-01-18
Payer: COMMERCIAL

## 2021-01-18 VITALS
WEIGHT: 216 LBS | HEART RATE: 94 BPM | HEIGHT: 64 IN | BODY MASS INDEX: 36.88 KG/M2 | DIASTOLIC BLOOD PRESSURE: 110 MMHG | SYSTOLIC BLOOD PRESSURE: 182 MMHG

## 2021-01-18 DIAGNOSIS — Z01.818 PRE-OP TESTING: ICD-10-CM

## 2021-01-18 DIAGNOSIS — S52.602A CLOSED FRACTURE DISTAL RADIUS AND ULNA, LEFT, INITIAL ENCOUNTER: ICD-10-CM

## 2021-01-18 DIAGNOSIS — Z01.818 PRE-OP TESTING: Primary | ICD-10-CM

## 2021-01-18 DIAGNOSIS — S52.502A CLOSED FRACTURE OF DISTAL END OF LEFT RADIUS, UNSPECIFIED FRACTURE MORPHOLOGY, INITIAL ENCOUNTER: Primary | ICD-10-CM

## 2021-01-18 DIAGNOSIS — S52.502A CLOSED FRACTURE DISTAL RADIUS AND ULNA, LEFT, INITIAL ENCOUNTER: ICD-10-CM

## 2021-01-18 LAB
ABSOLUTE EOS #: 0.17 K/UL (ref 0–0.44)
ABSOLUTE IMMATURE GRANULOCYTE: 0.03 K/UL (ref 0–0.3)
ABSOLUTE LYMPH #: 2.95 K/UL (ref 1.1–3.7)
ABSOLUTE MONO #: 0.54 K/UL (ref 0.1–1.2)
ANION GAP SERPL CALCULATED.3IONS-SCNC: 10 MMOL/L (ref 9–17)
BASOPHILS # BLD: 1 % (ref 0–2)
BASOPHILS ABSOLUTE: 0.05 K/UL (ref 0–0.2)
BUN BLDV-MCNC: 13 MG/DL (ref 6–20)
BUN/CREAT BLD: 15 (ref 9–20)
CALCIUM SERPL-MCNC: 9.3 MG/DL (ref 8.6–10.4)
CHLORIDE BLD-SCNC: 99 MMOL/L (ref 98–107)
CO2: 26 MMOL/L (ref 20–31)
CREAT SERPL-MCNC: 0.88 MG/DL (ref 0.5–0.9)
DIFFERENTIAL TYPE: NORMAL
EKG ATRIAL RATE: 87 BPM
EKG P AXIS: 76 DEGREES
EKG P-R INTERVAL: 124 MS
EKG Q-T INTERVAL: 366 MS
EKG QRS DURATION: 74 MS
EKG QTC CALCULATION (BAZETT): 440 MS
EKG R AXIS: 42 DEGREES
EKG T AXIS: -32 DEGREES
EKG VENTRICULAR RATE: 87 BPM
EOSINOPHILS RELATIVE PERCENT: 2 % (ref 1–4)
GFR AFRICAN AMERICAN: >60 ML/MIN
GFR NON-AFRICAN AMERICAN: >60 ML/MIN
GFR SERPL CREATININE-BSD FRML MDRD: ABNORMAL ML/MIN/{1.73_M2}
GFR SERPL CREATININE-BSD FRML MDRD: ABNORMAL ML/MIN/{1.73_M2}
GLUCOSE BLD-MCNC: 116 MG/DL (ref 70–99)
HCT VFR BLD CALC: 44.1 % (ref 36.3–47.1)
HEMOGLOBIN: 14.5 G/DL (ref 11.9–15.1)
IMMATURE GRANULOCYTES: 0 %
LYMPHOCYTES # BLD: 37 % (ref 24–43)
MCH RBC QN AUTO: 29.4 PG (ref 25.2–33.5)
MCHC RBC AUTO-ENTMCNC: 32.9 G/DL (ref 25.2–33.5)
MCV RBC AUTO: 89.3 FL (ref 82.6–102.9)
MONOCYTES # BLD: 7 % (ref 3–12)
NRBC AUTOMATED: 0 PER 100 WBC
PDW BLD-RTO: 13.6 % (ref 11.8–14.4)
PLATELET # BLD: 213 K/UL (ref 138–453)
PLATELET ESTIMATE: NORMAL
PMV BLD AUTO: 9.8 FL (ref 8.1–13.5)
POTASSIUM SERPL-SCNC: 3.7 MMOL/L (ref 3.7–5.3)
RBC # BLD: 4.94 M/UL (ref 3.95–5.11)
RBC # BLD: NORMAL 10*6/UL
SEG NEUTROPHILS: 53 % (ref 36–65)
SEGMENTED NEUTROPHILS ABSOLUTE COUNT: 4.24 K/UL (ref 1.5–8.1)
SODIUM BLD-SCNC: 135 MMOL/L (ref 135–144)
WBC # BLD: 8 K/UL (ref 3.5–11.3)
WBC # BLD: NORMAL 10*3/UL

## 2021-01-18 PROCEDURE — 99203 OFFICE O/P NEW LOW 30 MIN: CPT | Performed by: ORTHOPAEDIC SURGERY

## 2021-01-18 PROCEDURE — 93005 ELECTROCARDIOGRAM TRACING: CPT

## 2021-01-18 PROCEDURE — 85025 COMPLETE CBC W/AUTO DIFF WBC: CPT

## 2021-01-18 PROCEDURE — 80048 BASIC METABOLIC PNL TOTAL CA: CPT

## 2021-01-18 PROCEDURE — 36415 COLL VENOUS BLD VENIPUNCTURE: CPT

## 2021-01-18 RX ORDER — HYDROCODONE BITARTRATE AND ACETAMINOPHEN 5; 325 MG/1; MG/1
1 TABLET ORAL EVERY 6 HOURS PRN
Qty: 28 TABLET | Refills: 0 | Status: SHIPPED | OUTPATIENT
Start: 2021-01-18 | End: 2021-01-25

## 2021-01-18 NOTE — PROGRESS NOTES
Patient with fracture of the left radius and ulnar styloid. Did see Dr. Villa Samples and is going to have ORIF on Saturday. Requesting something for pain.   Script sent for patient
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
## 2021-01-18 NOTE — LETTER
[address] department of [place] [address]  Phone: [phone]  Fax: [phone]    Ragini Byers MD        January 18, 2021     Patient: Myrna Munoz   YOB: 1965   Date of Visit: 1/18/2021       To Whom It May Concern: It is my medical opinion that Colon Ash should remain out of work until  02/15/21. If you have any questions or concerns, please don't hesitate to call.     Sincerely,        Ragini Byers MD
Dee Dee Ash

## 2021-02-03 DIAGNOSIS — S52.502A CLOSED FRACTURE DISTAL RADIUS AND ULNA, LEFT, INITIAL ENCOUNTER: Primary | ICD-10-CM

## 2021-02-03 DIAGNOSIS — S52.602A CLOSED FRACTURE DISTAL RADIUS AND ULNA, LEFT, INITIAL ENCOUNTER: Primary | ICD-10-CM

## 2021-02-08 ENCOUNTER — HOSPITAL ENCOUNTER (OUTPATIENT)
Dept: GENERAL RADIOLOGY | Age: 56
Discharge: HOME | End: 2021-02-10
Payer: COMMERCIAL

## 2021-02-08 ENCOUNTER — OFFICE VISIT (OUTPATIENT)
Dept: ORTHOPEDIC SURGERY | Age: 56
End: 2021-02-08
Payer: COMMERCIAL

## 2021-02-08 VITALS
HEART RATE: 82 BPM | WEIGHT: 216 LBS | BODY MASS INDEX: 36.88 KG/M2 | HEIGHT: 64 IN | SYSTOLIC BLOOD PRESSURE: 124 MMHG | DIASTOLIC BLOOD PRESSURE: 88 MMHG

## 2021-02-08 DIAGNOSIS — S52.502A CLOSED FRACTURE DISTAL RADIUS AND ULNA, LEFT, INITIAL ENCOUNTER: ICD-10-CM

## 2021-02-08 DIAGNOSIS — S52.502A CLOSED FRACTURE DISTAL RADIUS AND ULNA, LEFT, INITIAL ENCOUNTER: Primary | ICD-10-CM

## 2021-02-08 DIAGNOSIS — S52.602A CLOSED FRACTURE DISTAL RADIUS AND ULNA, LEFT, INITIAL ENCOUNTER: Primary | ICD-10-CM

## 2021-02-08 DIAGNOSIS — S52.602A CLOSED FRACTURE DISTAL RADIUS AND ULNA, LEFT, INITIAL ENCOUNTER: ICD-10-CM

## 2021-02-08 PROCEDURE — L3807 WHFO W/O JOINTS PRE CST: HCPCS | Performed by: ORTHOPAEDIC SURGERY

## 2021-02-08 PROCEDURE — 73110 X-RAY EXAM OF WRIST: CPT

## 2021-02-08 PROCEDURE — 99024 POSTOP FOLLOW-UP VISIT: CPT | Performed by: ORTHOPAEDIC SURGERY

## 2021-02-08 RX ORDER — HYDROCODONE BITARTRATE AND ACETAMINOPHEN 5; 325 MG/1; MG/1
1 TABLET ORAL PRN
COMMUNITY
End: 2021-03-08
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
## 2021-02-08 NOTE — LETTER
[address] department of [place] [address]  Phone: [phone]  Fax: [phone]    Mikie Haney MD        February 8, 2021     Patient: Polo Vasquez   YOB: 1965   Date of Visit: 2/8/2021       To Whom It May Concern: It is my medical opinion that Jj Ramirez may return to work on 3-1-2021 with no restrictions. If you have any questions or concerns, please don't hesitate to call.     Sincerely,        Mikie Haney MD

## 2021-03-03 DIAGNOSIS — S52.502A CLOSED FRACTURE DISTAL RADIUS AND ULNA, LEFT, INITIAL ENCOUNTER: Primary | ICD-10-CM

## 2021-03-03 DIAGNOSIS — S52.602A CLOSED FRACTURE DISTAL RADIUS AND ULNA, LEFT, INITIAL ENCOUNTER: Primary | ICD-10-CM

## 2021-03-08 ENCOUNTER — HOSPITAL ENCOUNTER (OUTPATIENT)
Dept: GENERAL RADIOLOGY | Age: 56
Discharge: HOME | End: 2021-03-10
Payer: COMMERCIAL

## 2021-03-08 ENCOUNTER — OFFICE VISIT (OUTPATIENT)
Dept: ORTHOPEDIC SURGERY | Age: 56
End: 2021-03-08
Payer: COMMERCIAL

## 2021-03-08 VITALS
DIASTOLIC BLOOD PRESSURE: 84 MMHG | SYSTOLIC BLOOD PRESSURE: 130 MMHG | WEIGHT: 216 LBS | BODY MASS INDEX: 36.88 KG/M2 | HEIGHT: 64 IN | HEART RATE: 94 BPM

## 2021-03-08 DIAGNOSIS — S52.502D CLOSED FRACTURE DISTAL RADIUS AND ULNA, LEFT, WITH ROUTINE HEALING, SUBSEQUENT ENCOUNTER: ICD-10-CM

## 2021-03-08 DIAGNOSIS — S52.502A CLOSED FRACTURE DISTAL RADIUS AND ULNA, LEFT, INITIAL ENCOUNTER: Primary | ICD-10-CM

## 2021-03-08 DIAGNOSIS — S52.602A CLOSED FRACTURE DISTAL RADIUS AND ULNA, LEFT, INITIAL ENCOUNTER: ICD-10-CM

## 2021-03-08 DIAGNOSIS — S52.502A CLOSED FRACTURE DISTAL RADIUS AND ULNA, LEFT, INITIAL ENCOUNTER: ICD-10-CM

## 2021-03-08 DIAGNOSIS — S52.602A CLOSED FRACTURE DISTAL RADIUS AND ULNA, LEFT, INITIAL ENCOUNTER: Primary | ICD-10-CM

## 2021-03-08 DIAGNOSIS — S52.602D CLOSED FRACTURE DISTAL RADIUS AND ULNA, LEFT, WITH ROUTINE HEALING, SUBSEQUENT ENCOUNTER: ICD-10-CM

## 2021-03-08 PROCEDURE — 99024 POSTOP FOLLOW-UP VISIT: CPT | Performed by: ORTHOPAEDIC SURGERY

## 2021-03-08 PROCEDURE — 73110 X-RAY EXAM OF WRIST: CPT

## 2021-03-08 RX ORDER — DICLOFENAC SODIUM 75 MG/1
75 TABLET, DELAYED RELEASE ORAL 2 TIMES DAILY
Qty: 60 TABLET | Refills: 2 | Status: SHIPPED | OUTPATIENT
Start: 2021-03-08 | End: 2021-04-12
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
## 2021-03-16 ENCOUNTER — HOSPITAL ENCOUNTER (OUTPATIENT)
Dept: LAB | Age: 56
Discharge: HOME | End: 2021-03-16
Payer: COMMERCIAL

## 2021-03-16 DIAGNOSIS — E11.9 CONTROLLED TYPE 2 DIABETES MELLITUS WITHOUT COMPLICATION, WITHOUT LONG-TERM CURRENT USE OF INSULIN (HCC): ICD-10-CM

## 2021-03-16 LAB
ANION GAP SERPL CALCULATED.3IONS-SCNC: 13 MMOL/L (ref 9–17)
BUN BLDV-MCNC: 11 MG/DL (ref 6–20)
BUN/CREAT BLD: 14 (ref 9–20)
CALCIUM SERPL-MCNC: 9.7 MG/DL (ref 8.6–10.4)
CHLORIDE BLD-SCNC: 101 MMOL/L (ref 98–107)
CO2: 25 MMOL/L (ref 20–31)
CREAT SERPL-MCNC: 0.79 MG/DL (ref 0.5–0.9)
ESTIMATED AVERAGE GLUCOSE: 120 MG/DL
GFR AFRICAN AMERICAN: >60 ML/MIN
GFR NON-AFRICAN AMERICAN: >60 ML/MIN
GFR SERPL CREATININE-BSD FRML MDRD: ABNORMAL ML/MIN/{1.73_M2}
GFR SERPL CREATININE-BSD FRML MDRD: ABNORMAL ML/MIN/{1.73_M2}
GLUCOSE FASTING: 149 MG/DL (ref 70–99)
HBA1C MFR BLD: 5.8 % (ref 4–6)
POTASSIUM SERPL-SCNC: 5 MMOL/L (ref 3.7–5.3)
SODIUM BLD-SCNC: 139 MMOL/L (ref 135–144)

## 2021-03-16 PROCEDURE — 83036 HEMOGLOBIN GLYCOSYLATED A1C: CPT

## 2021-03-16 PROCEDURE — 80048 BASIC METABOLIC PNL TOTAL CA: CPT

## 2021-03-16 PROCEDURE — 36415 COLL VENOUS BLD VENIPUNCTURE: CPT

## 2021-03-18 ENCOUNTER — OFFICE VISIT (OUTPATIENT)
Dept: INTERNAL MEDICINE | Age: 56
End: 2021-03-18
Payer: COMMERCIAL

## 2021-03-18 ENCOUNTER — TELEPHONE (OUTPATIENT)
Dept: SURGERY | Age: 56
End: 2021-03-18

## 2021-03-18 VITALS
RESPIRATION RATE: 18 BRPM | HEIGHT: 64 IN | WEIGHT: 219 LBS | BODY MASS INDEX: 37.39 KG/M2 | SYSTOLIC BLOOD PRESSURE: 138 MMHG | HEART RATE: 60 BPM | DIASTOLIC BLOOD PRESSURE: 90 MMHG

## 2021-03-18 DIAGNOSIS — E03.4 HYPOTHYROIDISM DUE TO ACQUIRED ATROPHY OF THYROID: ICD-10-CM

## 2021-03-18 DIAGNOSIS — S62.102D CLOSED FRACTURE OF LEFT WRIST WITH ROUTINE HEALING, SUBSEQUENT ENCOUNTER: ICD-10-CM

## 2021-03-18 DIAGNOSIS — K21.9 GASTROESOPHAGEAL REFLUX DISEASE WITHOUT ESOPHAGITIS: ICD-10-CM

## 2021-03-18 DIAGNOSIS — Z12.11 ENCOUNTER FOR SCREENING COLONOSCOPY: ICD-10-CM

## 2021-03-18 DIAGNOSIS — E66.9 CLASS 2 OBESITY WITHOUT SERIOUS COMORBIDITY WITH BODY MASS INDEX (BMI) OF 37.0 TO 37.9 IN ADULT, UNSPECIFIED OBESITY TYPE: ICD-10-CM

## 2021-03-18 DIAGNOSIS — E11.9 CONTROLLED TYPE 2 DIABETES MELLITUS WITHOUT COMPLICATION, WITHOUT LONG-TERM CURRENT USE OF INSULIN (HCC): Primary | ICD-10-CM

## 2021-03-18 DIAGNOSIS — I10 ESSENTIAL HYPERTENSION: ICD-10-CM

## 2021-03-18 PROCEDURE — 99214 OFFICE O/P EST MOD 30 MIN: CPT | Performed by: INTERNAL MEDICINE

## 2021-03-18 RX ORDER — OMEPRAZOLE 20 MG/1
20 CAPSULE, DELAYED RELEASE ORAL DAILY
COMMUNITY
End: 2022-03-21

## 2021-03-18 ASSESSMENT — PATIENT HEALTH QUESTIONNAIRE - PHQ9
SUM OF ALL RESPONSES TO PHQ QUESTIONS 1-9: 0
2. FEELING DOWN, DEPRESSED OR HOPELESS: 0

## 2021-03-18 NOTE — PROGRESS NOTES
DR. Rodriguez Listen - PROGRESS NOTE    CHIEF COMPLAINT/HISTORY OF CHIEF COMPLAINT: This 54 y.o.  female comes in today for ongoing evaluation and management of her diabetes mellitus type 2, hypertension, hypothyroidism, gastroesophageal reflux disease, and obesity. In January she fell and broke her left wrist. She had surgery to repair it, which was done by Dr. Shay Chris. She saw him a couple of weeks ago and he felt she was doing well. She has recently started physical therapy. [place] Perches been trying to watch her diet and maintain regular physical activity in the form of walking to try to keep her diabetes under control. She takes metformin for her diabetes. She has not had any major hyper- or hypoglycemic episodes. She is on Synthroid for hypothyroidism, which has been stable. She denies any fatigue or temperature intolerance. She takes Prilosec for gastroesophageal reflux disease, which has been stable. She is on lisinopril/hydrochlorothiazide for hypertension. She has not had any chest pain or dizziness. She is due for another colonoscopy. Otherwise she seems to be doing fairly well and denies any other complaints. ALLERGIES/INTOLERANCES: No Known Allergies    MEDICATIONS:   Outpatient Medications Marked as Taking for the 3/18/21 encounter (Office Visit) with Belkis Goodwin DO   Medication Sig Dispense Refill    omeprazole (PRILOSEC) 20 MG delayed release capsule Take 20 mg by mouth daily      diclofenac (VOLTAREN) 75 MG EC tablet Take 1 tablet by mouth 2 times daily 60 tablet 2    levothyroxine (SYNTHROID) 75 MCG tablet TAKE 1/2 TAKE BY MOUTH DAILY 45 tablet 3    lisinopril-hydroCHLOROthiazide (PRINZIDE;ZESTORETIC) 10-12.5 MG per tablet TAKE 1 TABLET BY MOUTH ONE TIME A DAY 90 tablet 3    metFORMIN (GLUCOPHAGE) 500 MG tablet TAKE 1 TABLET BY MOUTH ONE TIME DAILY 90 tablet 3       IMMUNIZATIONS: Reviewed for influenza and pneumococcal status as indicated in electronic record.     REVIEW OF SYSTEMS:     General: negative for - chills, fever or night sweats  Skin: negative for - pruritus or rash  Head: Negative for: headache or recent history of head trauma  Ear, Nose, Throat: negative for - epistaxis, nasal congestion, nasal discharge, sore throat, tinnitus or vertigo  Cardiovascular: negative for - chest pain, dyspnea on exertion or shortness of breath  Respiratory: negative for - cough, hemoptysis or wheezing  Gastrointestinal: negative for - constipation, diarrhea or nausea/vomiting  Genitourinary: negative for - dysuria, hematuria or nocturia  Musculoskeletal: positive for - joint pain  negative for - muscle pain or muscular weakness  Neurologic: negative for - gait disturbance, numbness/tingling, seizures or tremors  Psychiatric: negative for - anxiety or depression    HEMOGLOBIN A1c FROM CURRENT AND PRIOR VISIT:    Hemoglobin A1C   Date Value Ref Range Status   03/16/2021 5.8 4.0 - 6.0 % Final   03/10/2020 5.5 4.8 - 5.9 % Final        PHYSICAL EXAMINATION:    Wt Readings from Last 2 Encounters:   03/18/21 219 lb (99.3 kg)   03/08/21 216 lb (98 kg)       Vitals:    03/18/21 0824 03/18/21 0828   BP: (!) 130/98 (!) 138/90   Site: Right Upper Arm Right Upper Arm   Position: Sitting Sitting   Cuff Size: Large Adult Large Adult   Pulse: 60    Resp: 18    Weight: 219 lb (99.3 kg)    Height: 5' 4\" (1.626 m)      Body mass index is 37.59 kg/m². General: This is a 54 y.o.  female who is alert and oriented to person, place and time. She appears to be her stated age and does not appear to be in any acute distress. Skin: Skin color, texture, turgor normal. No rashes or lesions. HEENT/Neck: Head: Normal, normocephalic, atraumatic. Eye: Normal external eye, conjunctiva, lids cornea, CELIA. Ears: Normal TM's bilaterally. Normal auditory canals and external ears. Non-tender. Nose: Normal external nose, mucus membranes and septum. Pharynx: Dental Hygiene adequate. Normal buccal mucosa.  Normal pharynx. Neck / Thyroid: Supple, no masses, nodes, nodules or enlargement. Lungs: Normal - CTA without rales, rhonchi, or wheezing. Heart: regular rate and rhythm, S1, S2 normal, no murmur, click, rub or gallop No S3 or S4. Abdomen: Obese, soft, non-distended, non-tender, normal active bowel sounds, no masses palpated and no hepatosplenomegaly  Extremities: There is no clubbing, cyanosis, or edema in any of the extremities. Neurologic:  cranial nerves II-XII are grossly intact  Osteopathic Structural Examination: Patient was examined in the seated and standing positions. There was full range of motion in the cervical, thoracic, and lumbar spines. No scoliosis. No change in thoracic kyphosis or lumbar lordosis. No increased paravertebral muscle tenderness. Diabetic foot check: Normal strength and range of motion of toes, feet, and ankles bilaterally. No joint deformity. No cyanosis or clubbing. 100% sensation at all 22 test points with the 10 gram filament. Dorsalis pedis pulses intact bilaterally. Capillary refill at the toes was less than 2 seconds. Vibratory sensation (with 128 Hz tuning fork) intact bilaterally. Light touch sensation intact bilaterally. Hair growth present on feet and toes bilaterally. No skin breakdown, erythema, rub spots, blisters, scaling, or ulcers. No calluses or corns. Toenails thin and not ingrown. No evidence of fungal infection. ASSESSMENT/PLAN:    1. Controlled type 2 diabetes mellitus without complication, without long-term current use of insulin (HCC)  - Her most recent HbA1c was 5.8%, which is lower than the target value of 6.5% or lower. We are not going to make changes to her diabetic medications. She was advised to continue to watch her diet and exercise to keep her diabetes under control.    - Hemoglobin A1C; Future  - Lipid Panel; Future  - Urinalysis with Microscopic; Future  - Microalbumin, Ur; Future    2.  Closed fracture of left wrist with routine healing, subsequent encounter, improving  - She will continue to get physical therapy  - We reviewed Dr. Joelle Miller most recent visit notes    3. Essential hypertension, controlled  - She will continue to take her antihypertensive medication   - CBC Auto Differential; Future  - Comprehensive Metabolic Panel, Fasting; Future    4. Hypothyroidism due to acquired atrophy of thyroid, stable  - She will continue to take Synthroid  - Lipid Panel; Future  - TSH without Reflex; Future  - T4, Free; Future    5. Gastroesophageal reflux disease without esophagitis, stable  - She will continue to take Prilosec    6. Class 2 obesity without serious comorbidity with body mass index (BMI) of 37.0 to 37.9 in adult, unspecified obesity type, stable  - We discussed weight loss  - She will continue to watch her diet and exercise     7. Encounter for screening colonoscopy  - We will get her back in with Dr. Alannah Gan to discuss her colonoscopy  - Cristo Mohan MD, General Surgery, [place]      Orders Placed This Encounter   Procedures    Hemoglobin A1C     Standing Status:   Future     Standing Expiration Date:   3/18/2022    CBC Auto Differential     Standing Status:   Future     Standing Expiration Date:   3/18/2022    Comprehensive Metabolic Panel, Fasting     Standing Status:   Future     Standing Expiration Date:   3/18/2022    Lipid Panel     Standing Status:   Future     Standing Expiration Date:   3/18/2022     Order Specific Question:   Is Patient Fasting?/# of Hours     Answer:   15    Urinalysis with Microscopic     Standing Status:   Future     Standing Expiration Date:   3/18/2022     Order Specific Question:   SPECIFY(EX-CATH,MIDSTREAM,CYSTO,ETC)?      Answer:   midstream    TSH without Reflex     Standing Status:   Future     Standing Expiration Date:   3/18/2022    T4, Free     Standing Status:   Future     Standing Expiration Date:   3/18/2022    Microalbumin, Ur     Standing Status:   Future     Standing Expiration Date:   3/18/2022   Qiana Herrera MD, General Surgery, [place]     Referral Priority:   Routine     Referral Type:   Eval and Treat     Referral Reason:   Specialty Services Required     Referred to Provider:   Lashawn Julian MD     Requested Specialty:   General Surgery     Number of Visits Requested:   1       Requested Prescriptions      No prescriptions requested or ordered in this encounter       Labs as ordered above. Return in about 3 months (around 6/18/2021).         Electronically signed by Ed Bush DO on 3/18/2021 at 9:00 AM  Internal Medicine

## 2021-03-18 NOTE — PATIENT INSTRUCTIONS
Patient Education        Learning About Meal Planning for Diabetes  Why plan your meals? Meal planning can be a key part of managing diabetes. Planning meals and snacks with the right balance of carbohydrate, protein, and fat can help you keep your blood sugar at the target level you set with your doctor. You don't have to eat special foods. You can eat what your family eats, including sweets once in a while. But you do have to pay attention to how often you eat and how much you eat of certain foods. You may want to work with a dietitian or a certified diabetes educator. He or she can give you tips and meal ideas and can answer your questions about meal planning. This health professional can also help you reach a healthy weight if that is one of your goals. What plan is right for you? Your dietitian or diabetes educator may suggest that you start with the plate format or carbohydrate counting. The plate format  The plate format is a simple way to help you manage how you eat. You plan meals by learning how much space each food should take on a plate. Using the plate format helps you spread carbohydrate throughout the day. It can make it easier to keep your blood sugar level within your target range. It also helps you see if you're eating healthy portion sizes. To use the plate format, you put non-starchy vegetables on half your plate. Add meat or meat substitutes on one-quarter of the plate. Put a grain or starchy vegetable (such as brown rice or a potato) on the final quarter of the plate. You can add a small piece of fruit and some low-fat or fat-free milk or yogurt, depending on your carbohydrate goal for each meal.  Here are some tips for using the plate format:  · Make sure that you are not using an oversized plate. A 9-inch plate is best. Many restaurants use larger plates. · Get used to using the plate format at home. Then you can use it when you eat out.   · Write down your questions about using the plate format. Talk to your doctor, a dietitian, or a diabetes educator about your concerns. Carbohydrate counting  With carbohydrate counting, you plan meals based on the amount of carbohydrate in each food. Carbohydrate raises blood sugar higher and more quickly than any other nutrient. It is found in desserts, breads and cereals, and fruit. It's also found in starchy vegetables such as potatoes and corn, grains such as rice and pasta, and milk and yogurt. Spreading carbohydrate throughout the day helps keep your blood sugar levels within your target range. Your daily amount depends on several things, including your weight, how active you are, which diabetes medicines you take, and what your goals are for your blood sugar levels. A registered dietitian or diabetes educator can help you plan how much carbohydrate to include in each meal and snack. A guideline for your daily amount of carbohydrate is:  · 45 to 60 grams at each meal. That's about the same as 3 to 4 carbohydrate servings. · 15 to 20 grams at each snack. That's about the same as 1 carbohydrate serving. The Nutrition Facts label on packaged foods tells you how much carbohydrate is in a serving of the food. First, look at the serving size on the food label. Is that the amount you eat in a serving? All of the nutrition information on a food label is based on that serving size. So if you eat more or less than that, you'll need to adjust the other numbers. Total carbohydrate is the next thing you need to look for on the label. If you count carbohydrate servings, one serving of carbohydrate is 15 grams. For foods that don't come with labels, such as fresh fruits and vegetables, you'll need a guide that lists carbohydrate in these foods. Ask your doctor, dietitian, or diabetes educator about books or other nutrition guides you can use.   If you take insulin, you need to know how many grams of carbohydrate are in a meal. This lets you know how much rapid-acting insulin to take before you eat. If you use an insulin pump, you get a constant rate of insulin during the day. So the pump must be programmed at meals to give you extra insulin to cover the rise in blood sugar after meals. When you know how much carbohydrate you will eat, you can take the right amount of insulin. Or, if you always use the same amount of insulin, you need to make sure that you eat the same amount of carbohydrate at meals. If you need more help to understand carbohydrate counting and food labels, ask your doctor, dietitian, or diabetes educator. How can you plan healthy meals? Here are some tips to get started:  · Plan your meals a week at a time. Don't forget to include snacks too. · Use cookbooks or online recipes to plan several main meals. Plan some quick meals for busy nights. You also can double some recipes that freeze well. Then you can save half for other busy nights when you don't have time to cook. · Make sure you have the ingredients you need for your recipes. If you're running low on basic items, put these items on your shopping list too. · List foods that you use to make breakfasts, lunches, and snacks. List plenty of fruits and vegetables. · Post this list on the refrigerator. Add to it as you think of more things you need. · Take the list to the store to do your weekly shopping. Follow-up care is a key part of your treatment and safety. Be sure to make and go to all appointments, and call your doctor if you are having problems. It's also a good idea to know your test results and keep a list of the medicines you take. Where can you learn more? Go to https://InStitchufredewniko.Hopscot.ch. org and sign in to your [employer] account. Enter K200 in the Face-Me box to learn more about \"Learning About Meal Planning for Diabetes. \"     If you do not have an account, please click on the \"Sign Up Now\" link.   Current as of: August 31, 2020               Content Version: 12.8  © 6700-5726 Healthwise, [employer]. Care instructions adapted under license by [facility] ([facility]). If you have questions about a medical condition or this instruction, always ask your healthcare professional. [address] any warranty or liability for your use of this information. Patient Education        Learning About Diabetes and Exercise  Can you exercise if you have diabetes? When you have diabetes, it's important to get regular exercise. This helps control your blood sugar level. You can still play sports, run, ride a bike, swim, and do other activities when you have diabetes. How does exercise help when you have diabetes? Getting regular exercise can help control your blood sugar. Your body turns the food you eat into glucose, a type of sugar. You need this sugar for energy. When you have diabetes, the sugar builds up in your blood. But when you exercise, your body uses sugar. This helps keep it from building up in your blood and results in lower blood sugar and better control of diabetes. Exercise may help you in other ways too. It can help you reach and stay at a healthy weight. It also helps improve blood pressure and cholesterol, which can reduce the risk of heart disease. Exercise can make you feel stronger and happier. It can help you relax and sleep better. And it can give you confidence in other things you do. Exercising safely when you have diabetes  Before you start a new exercise program, talk to your doctor about how and when to exercise. Some types of exercise can be harmful if your diabetes is causing other problems, such as problems with your feet. Your doctor can tell you what types of exercise are good choices for you. Here are some general safety tips. · Check your blood sugar before and after you exercise. Be careful about what you eat, especially if you take insulin or other medicines for diabetes.   · Take steps to avoid blood sugar problems. ? Ask your doctor what blood sugar range is safe for you when you exercise. ? If you take medicine or insulin that lowers blood sugar, check your blood sugar before you exercise. ? If your blood sugar is less than 90 mg/dL, you may need to eat a carbohydrate snack first.  ? Be careful when you exercise if your blood sugar is too high. · Try to exercise at about the same time each day. This may help keep your blood sugar steady. If you want to exercise more, slowly increase how hard or long you exercise. · Have someone with you when you exercise. Or exercise at a gym. You may need help if your blood sugar drops too low. · Keep some quick-sugar food with you. You may get symptoms of low blood sugar during exercise or up to 24 hours later. · Use proper footwear and the right equipment. · Pay attention to your body. If you are used to exercising and notice that you cannot do as much as usual, talk to your doctor. Follow-up care is a key part of your treatment and safety. Be sure to make and go to all appointments, and call your doctor if you are having problems. It's also a good idea to know your test results and keep a list of the medicines you take. Where can you learn more? Go to https://InkblazerspeFinanzchef24.C[employer] and Technologies. org and sign in to your [employer] account. Enter Q985 in the Tamecco box to learn more about \"Learning About Diabetes and Exercise. \"     If you do not have an account, please click on the \"Sign Up Now\" link. Current as of: August 31, 2020               Content Version: 12.8  © 2006-2021 Healthwise, Incorporated. Care instructions adapted under license by [facility] ([facility]). If you have questions about a medical condition or this instruction, always ask your healthcare professional. [place] [address] any warranty or liability for your use of this information.

## 2021-03-24 NOTE — TELEPHONE ENCOUNTER
Pharmacy requesting a refill of the below medication which has been pended for you:     Requested Prescriptions     Pending Prescriptions Disp Refills    metFORMIN (GLUCOPHAGE) 500 MG tablet [Pharmacy Med Name: METFORMIN HCL 500MG TABS] 90 tablet 3     Sig: TAKE 1 TABLET BY MOUTH ONE TIME A DAY       Last Appointment Date: 3/18/2021  Next Appointment Date: 6/25/2021    No Known Allergies

## 2021-04-07 DIAGNOSIS — S52.602D CLOSED FRACTURE DISTAL RADIUS AND ULNA, LEFT, WITH ROUTINE HEALING, SUBSEQUENT ENCOUNTER: Primary | ICD-10-CM

## 2021-04-07 DIAGNOSIS — S52.502D CLOSED FRACTURE DISTAL RADIUS AND ULNA, LEFT, WITH ROUTINE HEALING, SUBSEQUENT ENCOUNTER: Primary | ICD-10-CM

## 2021-04-09 NOTE — TELEPHONE ENCOUNTER
H &P Colonoscopy  Cristóbal Donta                 :1965  (Yes) patient has seen Dr. Goldy Elliott prior to procedure  PCP: Dr. Barbara Redmond of colon polyp        HPI:        Colonoscopy  Abd pain: no  Anemia: no  Bloating:yes  Diarrhea: no  Constipation: no  Melena: no  Hematochezia:no  Rectal Bleeding:no  Rectal/Anal Pain:no  Pruritus: no  Family history colon Cancer: no  Previous colon cancer: no  Previous Colon Polyp: yes  Change in bowels: no  Decrease caliber of stool: no  Change in color of stool: no    Previous work up date: Colonoscopy on 2016 by Dr. Goldy Elliott at [facility]=hyperplastic polyp       Family History   Problem Relation Age of Onset    High Blood Pressure Father     Crohn's Disease Mother     Heart Disease Maternal Grandmother     Diabetes Maternal Grandmother     Dementia Paternal Grandmother     Heart Disease Paternal Grandfather         age 54    Breast Cancer Maternal Aunt     Breast Cancer Paternal Aunt      Social History     Socioeconomic History    Marital status:      Spouse name: Not on file    Number of children: Not on file    Years of education: Not on file    Highest education level: Not on file   Occupational History    Not on file   Social Needs    Financial resource strain: Not hard at all   [employer] insecurity     Worry: Never true     Inability: Never true   [employer] needs     Medical: No     Non-medical: No   Tobacco Use    Smoking status: Never Smoker    Smokeless tobacco: Never Used   Substance and Sexual Activity    Alcohol use: Yes     Alcohol/week: 0.0 standard drinks     Frequency: 2-4 times a month     Drinks per session: 1 or 2     Binge frequency: Never     Comment: Socially.     Drug use: No    Sexual activity: Yes     Partners: Male   Lifestyle    Physical activity     Days per week: Not on file     Minutes per session: Not on file    Stress: Not on file   Relationships    Social connections     Talks on phone: Not on file     Gets together: Not on file     Attends Jain service: Not on file     Active member of club or organization: Not on file     Attends meetings of clubs or organizations: Not on file     Relationship status: Not on file    Intimate partner violence     Fear of current or ex partner: Not on file     Emotionally abused: Not on file     Physically abused: Not on file     Forced sexual activity: Not on file   Other Topics Concern    Not on file   Social History Narrative    Not on file     Past Surgical History:   Procedure Laterality Date    BREAST SURGERY Right 2002    surgery for benign bleeding    [address], [facility]AROSCOPIC  [facility]07/2017    [facility]     COLONOSCOPY  02/12/2016    hyperplastic polyp  Dr. Sigifredo Darling Left 08/07/2012    Dr. 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 Right upper back: Compound nevus with moderate architectural disorder and moderate cytoloigc atypia -  dysplastic nevus - recommend complete removal    WRIST SURGERY Right 04/12/2018    fracture plates and screws; ORIF    WRIST SURGERY Left 2021    ORIF     Past Medical History:   Diagnosis Date    Allergic rhinitis     Cubital tunnel syndrome     Left arm, s/p ulnar nerve surgery    Dysmenorrhea     Elevated fasting glucose     Hypertension     Obesity     Orbital fracture ([address] 2011    (Blowout) - left eye.     Plantar fasciitis     Pre-diabetes     Shingles 05/2018     Current Outpatient Medications on File Prior to Visit   Medication Sig Dispense Refill    omeprazole (PRILOSEC) 20 MG delayed release capsule Take 20 mg by mouth daily      levothyroxine (SYNTHROID) 75 MCG tablet TAKE 1/2 TAKE BY MOUTH DAILY 45 tablet 3    lisinopril-hydroCHLOROthiazide (PRINZIDE;ZESTORETIC) 10-12.5 MG per tablet TAKE 1 TABLET BY MOUTH ONE TIME A DAY 90 tablet 3    diclofenac (VOLTAREN) 75 MG EC tablet Take 1 tablet by mouth 2 times daily 60 tablet 2     No current facility-administered medications on file prior to visit.       Allergies as of 03/18/2021    (No Known Allergies)           PEx:                ASSESSMENT:        PLAN: Colonoscopy

## 2021-04-09 NOTE — TELEPHONE ENCOUNTER
Instructions reviewed and sent to the patient. All questions answered and patient denies any further questions at this time. Patient scheduled for Colonoscopy on 5/7/2021 at [facility] with Dr. Lucero Pratt. Instructed patient she can purchase the bowel prep (miralax/gatorade) over the counter at her pharmacy. Patient verbalized understanding and denies any questions at this time.

## 2021-04-12 ENCOUNTER — HOSPITAL ENCOUNTER (OUTPATIENT)
Dept: GENERAL RADIOLOGY | Age: 56
Discharge: HOME | End: 2021-04-14
Payer: COMMERCIAL

## 2021-04-12 ENCOUNTER — OFFICE VISIT (OUTPATIENT)
Dept: ORTHOPEDIC SURGERY | Age: 56
End: 2021-04-12
Payer: COMMERCIAL

## 2021-04-12 VITALS
HEART RATE: 97 BPM | BODY MASS INDEX: 36.88 KG/M2 | DIASTOLIC BLOOD PRESSURE: 84 MMHG | WEIGHT: 216 LBS | HEIGHT: 64 IN | SYSTOLIC BLOOD PRESSURE: 134 MMHG

## 2021-04-12 DIAGNOSIS — S52.602D CLOSED FRACTURE DISTAL RADIUS AND ULNA, LEFT, WITH ROUTINE HEALING, SUBSEQUENT ENCOUNTER: ICD-10-CM

## 2021-04-12 DIAGNOSIS — S52.602D CLOSED FRACTURE DISTAL RADIUS AND ULNA, LEFT, WITH ROUTINE HEALING, SUBSEQUENT ENCOUNTER: Primary | ICD-10-CM

## 2021-04-12 DIAGNOSIS — S52.502D CLOSED FRACTURE DISTAL RADIUS AND ULNA, LEFT, WITH ROUTINE HEALING, SUBSEQUENT ENCOUNTER: ICD-10-CM

## 2021-04-12 DIAGNOSIS — S52.502D CLOSED FRACTURE DISTAL RADIUS AND ULNA, LEFT, WITH ROUTINE HEALING, SUBSEQUENT ENCOUNTER: Primary | ICD-10-CM

## 2021-04-12 PROCEDURE — 73110 X-RAY EXAM OF WRIST: CPT

## 2021-04-12 PROCEDURE — 99024 POSTOP FOLLOW-UP VISIT: CPT | Performed by: ORTHOPAEDIC SURGERY

## 2021-04-13 NOTE — PROGRESS NOTES
Orthopedic Office Note  [facility]  [address]  [address], [address]  [place] [facility] [place] [address] [phone]      CHIEF COMPLAINT:    Chief Complaint   Patient presents with    Wrist Pain     rech left wrist post orif       HISTORY OF PRESENT ILLNESS:      The patient is a 54 y.o. female  who presents today for follow-up of her left distal radius ORIF doing well. She reports her paresthesias have resolved and she has very limited wrist discomfort. Past Medical History:    Past Medical History:   Diagnosis Date    Allergic rhinitis     Cubital tunnel syndrome     Left arm, s/p ulnar nerve surgery    Dysmenorrhea     Elevated fasting glucose     Hypertension     Obesity     Orbital fracture ([address] 2011    (Blowout) - left eye.  Plantar fasciitis     Pre-diabetes     Shingles 05/2018       Past Surgical History:    Past Surgical History:   Procedure Laterality Date    BREAST SURGERY Right 2002    surgery for benign bleeding    [address]     COLONOSCOPY  02/12/2016    hyperplastic polyp  Dr. Ketty Reed Left 08/07/2012    Dr. 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   Right upper back: Compound nevus with moderate architectural disorder and moderate cytoloigc atypia -  dysplastic nevus - recommend complete removal    WRIST SURGERY Right 04/12/2018    fracture plates and screws; ORIF    WRIST SURGERY Left 2021    ORIF       Medications Prior to Admission:   Current Outpatient Medications   Medication Sig Dispense Refill    metFORMIN (GLUCOPHAGE) 500 MG tablet TAKE 1 subsequent encounter        Patient is doing well after her ORIF of left distal radius. She will continue with her stretching exercises. She will progress with activities as tolerated. She will follow up here on an as-needed basis. No orders of the defined types were placed in this encounter.        Marito Da Silva MD

## 2021-05-26 RX ORDER — LISINOPRIL AND HYDROCHLOROTHIAZIDE 12.5; 1 MG/1; MG/1
TABLET ORAL
Qty: 90 TABLET | Refills: 3 | Status: SHIPPED | OUTPATIENT
Start: 2021-05-26 | End: 2022-02-16 | Stop reason: SDUPTHER

## 2021-09-30 ENCOUNTER — TELEPHONE (OUTPATIENT)
Dept: INTERNAL MEDICINE | Age: 56
End: 2021-09-30

## 2021-09-30 ENCOUNTER — OFFICE VISIT (OUTPATIENT)
Dept: INTERNAL MEDICINE | Age: 56
End: 2021-09-30
Payer: COMMERCIAL

## 2021-09-30 ENCOUNTER — HOSPITAL ENCOUNTER (OUTPATIENT)
Dept: LAB | Age: 56
Discharge: HOME | End: 2021-09-30
Payer: COMMERCIAL

## 2021-09-30 VITALS
BODY MASS INDEX: 37.05 KG/M2 | SYSTOLIC BLOOD PRESSURE: 138 MMHG | HEART RATE: 78 BPM | WEIGHT: 217 LBS | RESPIRATION RATE: 16 BRPM | DIASTOLIC BLOOD PRESSURE: 88 MMHG | HEIGHT: 64 IN

## 2021-09-30 DIAGNOSIS — I10 ESSENTIAL HYPERTENSION: ICD-10-CM

## 2021-09-30 DIAGNOSIS — Z12.31 OTHER SCREENING MAMMOGRAM: ICD-10-CM

## 2021-09-30 DIAGNOSIS — E66.01 CLASS 2 SEVERE OBESITY WITH SERIOUS COMORBIDITY AND BODY MASS INDEX (BMI) OF 37.0 TO 37.9 IN ADULT, UNSPECIFIED OBESITY TYPE (HCC): ICD-10-CM

## 2021-09-30 DIAGNOSIS — R30.0 BURNING WITH URINATION: ICD-10-CM

## 2021-09-30 DIAGNOSIS — E11.9 CONTROLLED TYPE 2 DIABETES MELLITUS WITHOUT COMPLICATION, WITHOUT LONG-TERM CURRENT USE OF INSULIN (HCC): Primary | ICD-10-CM

## 2021-09-30 DIAGNOSIS — E03.4 HYPOTHYROIDISM DUE TO ACQUIRED ATROPHY OF THYROID: ICD-10-CM

## 2021-09-30 DIAGNOSIS — K21.9 GASTROESOPHAGEAL REFLUX DISEASE WITHOUT ESOPHAGITIS: ICD-10-CM

## 2021-09-30 DIAGNOSIS — R79.89 ELEVATED LFTS: ICD-10-CM

## 2021-09-30 DIAGNOSIS — R30.0 BURNING WITH URINATION: Primary | ICD-10-CM

## 2021-09-30 DIAGNOSIS — N30.00 ACUTE CYSTITIS WITHOUT HEMATURIA: ICD-10-CM

## 2021-09-30 DIAGNOSIS — Z23 INFLUENZA VACCINE NEEDED: ICD-10-CM

## 2021-09-30 DIAGNOSIS — E11.9 CONTROLLED TYPE 2 DIABETES MELLITUS WITHOUT COMPLICATION, WITHOUT LONG-TERM CURRENT USE OF INSULIN (HCC): ICD-10-CM

## 2021-09-30 DIAGNOSIS — R35.0 FREQUENCY OF URINATION: ICD-10-CM

## 2021-09-30 LAB
-: ABNORMAL
ABSOLUTE EOS #: 0.13 K/UL (ref 0–0.44)
ABSOLUTE IMMATURE GRANULOCYTE: <0.03 K/UL (ref 0–0.3)
ABSOLUTE LYMPH #: 2.36 K/UL (ref 1.1–3.7)
ABSOLUTE MONO #: 0.54 K/UL (ref 0.1–1.2)
ALBUMIN SERPL-MCNC: 4.4 G/DL (ref 3.5–5.2)
ALBUMIN/GLOBULIN RATIO: 1.5 (ref 1–2.5)
ALP BLD-CCNC: 72 U/L (ref 35–104)
ALT SERPL-CCNC: 52 U/L (ref 5–33)
AMORPHOUS: ABNORMAL
ANION GAP SERPL CALCULATED.3IONS-SCNC: 13 MMOL/L (ref 9–17)
AST SERPL-CCNC: 41 U/L
BACTERIA: ABNORMAL
BASOPHILS # BLD: 1 % (ref 0–2)
BASOPHILS ABSOLUTE: 0.06 K/UL (ref 0–0.2)
BILIRUB SERPL-MCNC: 0.77 MG/DL (ref 0.3–1.2)
BILIRUBIN URINE: NEGATIVE
BUN BLDV-MCNC: 11 MG/DL (ref 6–20)
BUN/CREAT BLD: 14 (ref 9–20)
CALCIUM SERPL-MCNC: 9.4 MG/DL (ref 8.6–10.4)
CASTS UA: ABNORMAL /LPF (ref 0–2)
CHLORIDE BLD-SCNC: 99 MMOL/L (ref 98–107)
CHOLESTEROL/HDL RATIO: 4.1
CHOLESTEROL: 180 MG/DL
CO2: 26 MMOL/L (ref 20–31)
COLOR: ABNORMAL
COMMENT UA: ABNORMAL
CREAT SERPL-MCNC: 0.78 MG/DL (ref 0.5–0.9)
CREATININE URINE: 99.1 MG/DL (ref 28–217)
CRYSTALS, UA: ABNORMAL /HPF
DIFFERENTIAL TYPE: NORMAL
EOSINOPHILS RELATIVE PERCENT: 2 % (ref 1–4)
EPITHELIAL CELLS UA: ABNORMAL /HPF (ref 0–5)
ESTIMATED AVERAGE GLUCOSE: 111 MG/DL
GFR AFRICAN AMERICAN: >60 ML/MIN
GFR NON-AFRICAN AMERICAN: >60 ML/MIN
GFR SERPL CREATININE-BSD FRML MDRD: ABNORMAL ML/MIN/{1.73_M2}
GFR SERPL CREATININE-BSD FRML MDRD: ABNORMAL ML/MIN/{1.73_M2}
GLUCOSE FASTING: 138 MG/DL (ref 70–99)
GLUCOSE URINE: NEGATIVE
HBA1C MFR BLD: 5.5 % (ref 4–6)
HCT VFR BLD CALC: 44 % (ref 36.3–47.1)
HDLC SERPL-MCNC: 44 MG/DL
HEMOGLOBIN: 14.4 G/DL (ref 11.9–15.1)
IMMATURE GRANULOCYTES: 0 %
KETONES, URINE: NEGATIVE
LDL CHOLESTEROL: 104 MG/DL (ref 0–130)
LEUKOCYTE ESTERASE, URINE: ABNORMAL
LYMPHOCYTES # BLD: 34 % (ref 24–43)
MCH RBC QN AUTO: 29.3 PG (ref 25.2–33.5)
MCHC RBC AUTO-ENTMCNC: 32.7 G/DL (ref 25.2–33.5)
MCV RBC AUTO: 89.4 FL (ref 82.6–102.9)
MICROALBUMIN/CREAT 24H UR: 14 MG/L
MICROALBUMIN/CREAT UR-RTO: 14 MCG/MG CREAT
MONOCYTES # BLD: 8 % (ref 3–12)
MUCUS: ABNORMAL
NITRITE, URINE: POSITIVE
NRBC AUTOMATED: 0 PER 100 WBC
OTHER OBSERVATIONS UA: ABNORMAL
PDW BLD-RTO: 13.1 % (ref 11.8–14.4)
PH UA: 7 (ref 5–6)
PLATELET # BLD: 172 K/UL (ref 138–453)
PLATELET ESTIMATE: NORMAL
PMV BLD AUTO: 10.7 FL (ref 8.1–13.5)
POTASSIUM SERPL-SCNC: 4.2 MMOL/L (ref 3.7–5.3)
PROTEIN UA: NEGATIVE
RBC # BLD: 4.92 M/UL (ref 3.95–5.11)
RBC # BLD: NORMAL 10*6/UL
RBC UA: ABNORMAL /HPF (ref 0–4)
RENAL EPITHELIAL, UA: ABNORMAL /HPF
SEG NEUTROPHILS: 55 % (ref 36–65)
SEGMENTED NEUTROPHILS ABSOLUTE COUNT: 3.76 K/UL (ref 1.5–8.1)
SODIUM BLD-SCNC: 138 MMOL/L (ref 135–144)
SPECIFIC GRAVITY UA: 1.02 (ref 1.01–1.02)
THYROXINE, FREE: 0.97 NG/DL (ref 0.93–1.7)
TOTAL PROTEIN: 7.4 G/DL (ref 6.4–8.3)
TRICHOMONAS: ABNORMAL
TRIGL SERPL-MCNC: 160 MG/DL
TSH SERPL DL<=0.05 MIU/L-ACNC: 1.65 MIU/L (ref 0.3–5)
TURBIDITY: ABNORMAL
URINE HGB: NEGATIVE
UROBILINOGEN, URINE: NORMAL
VLDLC SERPL CALC-MCNC: ABNORMAL MG/DL (ref 1–30)
WBC # BLD: 6.9 K/UL (ref 3.5–11.3)
WBC # BLD: NORMAL 10*3/UL
WBC UA: ABNORMAL /HPF (ref 0–4)
YEAST: ABNORMAL

## 2021-09-30 PROCEDURE — 84439 ASSAY OF FREE THYROXINE: CPT

## 2021-09-30 PROCEDURE — 80053 COMPREHEN METABOLIC PANEL: CPT

## 2021-09-30 PROCEDURE — 80061 LIPID PANEL: CPT

## 2021-09-30 PROCEDURE — 90686 IIV4 VACC NO PRSV 0.5 ML IM: CPT | Performed by: INTERNAL MEDICINE

## 2021-09-30 PROCEDURE — 83036 HEMOGLOBIN GLYCOSYLATED A1C: CPT

## 2021-09-30 PROCEDURE — 85025 COMPLETE CBC W/AUTO DIFF WBC: CPT

## 2021-09-30 PROCEDURE — 87186 SC STD MICRODIL/AGAR DIL: CPT

## 2021-09-30 PROCEDURE — 87077 CULTURE AEROBIC IDENTIFY: CPT

## 2021-09-30 PROCEDURE — 81001 URINALYSIS AUTO W/SCOPE: CPT

## 2021-09-30 PROCEDURE — 87086 URINE CULTURE/COLONY COUNT: CPT

## 2021-09-30 PROCEDURE — 82570 ASSAY OF URINE CREATININE: CPT

## 2021-09-30 PROCEDURE — 99214 OFFICE O/P EST MOD 30 MIN: CPT | Performed by: INTERNAL MEDICINE

## 2021-09-30 PROCEDURE — 84443 ASSAY THYROID STIM HORMONE: CPT

## 2021-09-30 PROCEDURE — 82043 UR ALBUMIN QUANTITATIVE: CPT

## 2021-09-30 PROCEDURE — 90471 IMMUNIZATION ADMIN: CPT | Performed by: INTERNAL MEDICINE

## 2021-09-30 PROCEDURE — 36415 COLL VENOUS BLD VENIPUNCTURE: CPT

## 2021-09-30 RX ORDER — SULFAMETHOXAZOLE AND TRIMETHOPRIM 800; 160 MG/1; MG/1
1 TABLET ORAL 2 TIMES DAILY
Qty: 20 TABLET | Refills: 0 | Status: SHIPPED | OUTPATIENT
Start: 2021-09-30 | End: 2021-10-10

## 2021-09-30 SDOH — ECONOMIC STABILITY: FOOD INSECURITY: WITHIN THE PAST 12 MONTHS, YOU WORRIED THAT YOUR FOOD WOULD RUN OUT BEFORE YOU GOT MONEY TO BUY MORE.: NEVER TRUE

## 2021-09-30 SDOH — ECONOMIC STABILITY: FOOD INSECURITY: WITHIN THE PAST 12 MONTHS, THE FOOD YOU BOUGHT JUST DIDN'T LAST AND YOU DIDN'T HAVE MONEY TO GET MORE.: NEVER TRUE

## 2021-09-30 ASSESSMENT — SOCIAL DETERMINANTS OF HEALTH (SDOH): HOW HARD IS IT FOR YOU TO PAY FOR THE VERY BASICS LIKE FOOD, HOUSING, MEDICAL CARE, AND HEATING?: NOT HARD AT ALL

## 2021-09-30 NOTE — TELEPHONE ENCOUNTER
Patient states she is having burning with urination and frequency. Wants order placed for UTI. Coming to see you this afternoon for her regular 3 mth recheck. Ordered.

## 2021-09-30 NOTE — PROGRESS NOTES
Yessica received counseling on the following healthy behaviors: nutrition and exercise  Reviewed prior labs and health maintenance  Continue current medications, diet and exercise. Discussed use, benefit, and side effects of prescribed medications. Barriers to medication compliance addressed. Patient given educational materials - see patient instructions  Was a self-tracking handout given in paper form or via Kerah[employer]? Yes    Requested Prescriptions     Signed Prescriptions Disp Refills    sulfamethoxazole-trimethoprim (BACTRIM DS;SEPTRA DS) 800-160 MG per tablet 20 tablet 0     Sig: Take 1 tablet by mouth 2 times daily for 10 days       All patient questions answered. Patient voiced understanding. Quality Measures    Body mass index is 37.25 kg/m². Elevated. Weight control plan discussed: Healthy diet and regular exercise. BP: 138/88 Blood pressure is normal. Treatment plan: See main progress note. Lab Results   Component Value Date    LDLCHOLESTEROL 92 03/10/2020    (goal LDL reduction with dx if diabetes is 50% LDL reduction)      PHQ Scores 3/18/2021 3/12/2020 3/11/2019 9/11/2018 5/30/2018 1/5/2018 10/13/2016   PHQ2 Score 0 0 0 0 0 0 0   PHQ9 Score 0 0 0 0 0 0 0     See progress note for plan, if depression exists. Interpretation of Total Score: Major depression if the answer to questions 1 or 2 and 5 or more of questions 1 to 9 are at least [facility] than half the days. \"  Other depressive syndrome if questions 1 or 2 and two, three, or four of questions 1 to 9 are at least [facility] than half the days\"   Depression Severity: 5-9 = Mild depression, 10-14 = Moderate depression, 15-19 = Moderately severe depression, 20-27 = Severe depression

## 2021-09-30 NOTE — PROGRESS NOTES
DR. Laurie Bermudez - PROGRESS NOTE    CHIEF COMPLAINT/HISTORY OF CHIEF COMPLAINT: This 64 y.o.  female comes in today for ongoing evaluation and management of her diabetes mellitus type 2, hypertension, hypothyroidism, gastroesophageal reflux disease, and obesity. For the last couple of weeks she has been having increased urinary frequency and some slight burning when she urinates. She did a urinalysis this morning, but she is pretty certain she has a urinary tract infection. Paige Pearlt been trying to watch her diet and maintain regular physical activity in the form of walking to try to keep her diabetes under control. She takes metformin for her diabetes. She denies any major hyper- or hypoglycemic episodes. She is on Synthroid for hypothyroidism, which has been stable. She has not had any fatigue or temperature intolerance. She takes Prilosec for gastroesophageal reflux disease, which has been stable. She is on lisinopril/hydrochlorothiazide for hypertension. She denies any chest pain or dizziness. Otherwise she seems to be doing fairly well and denies any other complaints. She would like to get her flu shot today. ALLERGIES/INTOLERANCES: No Known Allergies    MEDICATIONS:   Outpatient Medications Marked as Taking for the 9/30/21 encounter (Office Visit) with Kevan López, DO   Medication Sig Dispense Refill    lisinopril-hydroCHLOROthiazide (PRINZIDE;ZESTORETIC) 10-12.5 MG per tablet TAKE 1 TABLET BY MOUTH ONE TIME A DAY 90 tablet 3    metFORMIN (GLUCOPHAGE) 500 MG tablet TAKE 1 TABLET BY MOUTH ONE TIME A DAY 90 tablet 3    omeprazole (PRILOSEC) 20 MG delayed release capsule Take 20 mg by mouth daily         IMMUNIZATIONS: Reviewed for influenza and pneumococcal status as indicated in electronic record.     REVIEW OF SYSTEMS:     General: negative for - chills, fever or night sweats  Skin: negative for - pruritus or rash  Head: Negative for: headache or recent history of head trauma  Ear, Nose, Throat: negative for - epistaxis, nasal congestion, nasal discharge, sore throat, tinnitus or vertigo  Cardiovascular: negative for - chest pain, dyspnea on exertion or shortness of breath  Respiratory: negative for - cough, hemoptysis or wheezing  Gastrointestinal: negative for - constipation, diarrhea or nausea/vomiting  Genitourinary: positive for - dysuria and urinary frequency/urgency  negative for - hematuria or nocturia  Musculoskeletal: positive for - joint pain  negative for - muscle pain or muscular weakness  Neurologic: negative for - gait disturbance, numbness/tingling, seizures or tremors  Psychiatric: negative for - anxiety or depression      HEMOGLOBIN A1c FROM CURRENT AND PRIOR VISIT:    Hemoglobin A1C   Date Value Ref Range Status   03/16/2021 5.8 4.0 - 6.0 % Final   03/10/2020 5.5 4.8 - 5.9 % Final        PHYSICAL EXAMINATION:    Wt Readings from Last 2 Encounters:   09/30/21 217 lb (98.4 kg)   04/12/21 216 lb (98 kg)       Vitals:    09/30/21 1356   BP: 138/88   Site: Right Upper Arm   Position: Sitting   Cuff Size: Large Adult   Pulse: 78   Resp: 16   Weight: 217 lb (98.4 kg)   Height: 5' 4\" (1.626 m)     Body mass index is 37.25 kg/m². General: This is a 64 y.o.  female who is alert and oriented to person, place and time. She appears to be her stated age and does not appear to be in any acute distress. Skin: Skin color, texture, turgor normal. No rashes or lesions. HEENT/Neck: Head: Normal, normocephalic, atraumatic. Eye: Normal external eye, conjunctiva, lids cornea, CELIA. Ears: Normal TM's bilaterally. Normal auditory canals and external ears. Non-tender. Nose: Normal external nose, mucus membranes and septum. Pharynx: Dental Hygiene adequate. Normal buccal mucosa. Normal pharynx. Neck / Thyroid: Supple, no masses, nodes, nodules or enlargement. Lungs: Normal - CTA without rales, rhonchi, or wheezing.   Heart: regular rate and rhythm, S1, S2 normal, no murmur, click, rub or gallop No S3 or S4. Abdomen: Obese, soft, non-distended, non-tender, normal active bowel sounds, no masses palpated and no hepatosplenomegaly  Extremities: There is no clubbing, cyanosis, or edema in any of the extremities. Neurologic:  cranial nerves II-XII are grossly intact  Osteopathic Structural Examination: Patient was examined in the seated and standing positions. There was full range of motion in the cervical, thoracic, and lumbar spines. No scoliosis. No change in thoracic kyphosis or lumbar lordosis. No increased paravertebral muscle tenderness. Diabetic foot check: Normal strength and range of motion of toes, feet, and ankles bilaterally. No joint deformity. No cyanosis or clubbing. 100% sensation at all 22 test points with the 10 gram filament. Dorsalis pedis pulses intact bilaterally. Capillary refill at the toes was less than 2 seconds. Vibratory sensation (with 128 Hz tuning fork) intact bilaterally. Light touch sensation intact bilaterally. Hair growth present on feet and toes bilaterally. No skin breakdown, erythema, rub spots, blisters, scaling, or ulcers. No calluses or corns. Toenails thin and not ingrown. No evidence of fungal infection. ASSESSMENT/PLAN:    1. Controlled type 2 diabetes mellitus without complication, without long-term current use of insulin ([address]  - We do not have a recent HbA1c so we are not going to make changes to her diabetic medications. She was advised to continue to watch her diet and exercise to keep her diabetes under control.    - Basic Metabolic Panel, Fasting; Future  - Hemoglobin A1C; Future  -  DIABETES FOOT EXAM    2. Acute cystitis without hematuria  - She does have a UTI as seen on the urinalysis  - We will give her some Bactrim DS twice a day for 10 days    3. Elevated LFTs, new  - We will get an ultrasound of the liver to see if she could have fatty liver disease  - US LIVER; Future    4.  Essential hypertension, controlled  - She will continue to take her antihypertensive medication     5. Hypothyroidism due to acquired atrophy of thyroid, stable  - She will continue to take Synthroid    6. Gastroesophageal reflux disease without esophagitis, stable  - She will continue to take Prilosec    7. Class 2 severe obesity with serious comorbidity and body mass index (BMI) of 37.0 to 37.9 in adult, unspecified obesity type ([facility] [address] stable  - We discussed weight loss  - She will continue to watch her diet and exercise     8. Other screening mammogram  - We ordered her mammogram  - [facility] DIGITAL SCREEN BILATERAL; Future    9. Influenza vaccine needed  - We gave her the flu shot that she requested. - INFLUENZA, QUADV, 3 YRS AND OLDER, IM PF, PREFILL SYR OR SDV, 0.5ML (Frieda Goodpasture, PF)      Orders Placed This Encounter   Procedures    [facility] DIGITAL SCREEN BILATERAL     Standing Status:   Future     Standing Expiration Date:   9/30/2022     Order Specific Question:   Reason for exam:     Answer:   screening mammogram    US LIVER     Standing Status:   Future     Standing Expiration Date:   9/30/2022     Order Specific Question:   Reason for exam:     Answer:   elevated LFTs. Looking for fatty infiltration    INFLUENZA, QUADV, 3 YRS AND OLDER, IM PF, PREFILL SYR OR SDV, 0.5ML (AFLURIA QUADV, PF)    Basic Metabolic Panel, Fasting     Standing Status:   Future     Standing Expiration Date:   9/30/2022    Hemoglobin A1C     Standing Status:   Future     Standing Expiration Date:   9/30/2022     DIABETES FOOT EXAM       Requested Prescriptions     Signed Prescriptions Disp Refills    sulfamethoxazole-trimethoprim (BACTRIM DS;SEPTRA DS) 800-160 MG per tablet 20 tablet 0     Sig: Take 1 tablet by mouth 2 times daily for 10 days       Labs and medications as ordered above. Return in about 3 months (around 12/30/2021).         Electronically signed by [employer],  on 9/30/2021 at 2:35 PM  Internal Medicine

## 2021-09-30 NOTE — PATIENT INSTRUCTIONS
Patient Education        Learning About Meal Planning for Diabetes  Why plan your meals? Meal planning can be a key part of managing diabetes. Planning meals and snacks with the right balance of carbohydrate, protein, and fat can help you keep your blood sugar at the target level you set with your doctor. You don't have to eat special foods. You can eat what your family eats, including sweets once in a while. But you do have to pay attention to how often you eat and how much you eat of certain foods. You may want to work with a dietitian or a certified diabetes educator. He or she can give you tips and meal ideas and can answer your questions about meal planning. This health professional can also help you reach a healthy weight if that is one of your goals. What plan is right for you? Your dietitian or diabetes educator may suggest that you start with the plate format or carbohydrate counting. The plate format  The plate format is a simple way to help you manage how you eat. You plan meals by learning how much space each food should take on a plate. Using the plate format helps you spread carbohydrate throughout the day. It can make it easier to keep your blood sugar level within your target range. It also helps you see if you're eating healthy portion sizes. To use the plate format, you put non-starchy vegetables on half your plate. Add meat or meat substitutes on one-quarter of the plate. Put a grain or starchy vegetable (such as brown rice or a potato) on the final quarter of the plate. You can add a small piece of fruit and some low-fat or fat-free milk or yogurt, depending on your carbohydrate goal for each meal.  Here are some tips for using the plate format:  · Make sure that you are not using an oversized plate. A 9-inch plate is best. Many restaurants use larger plates. · Get used to using the plate format at home. Then you can use it when you eat out.   · Write down your questions about using the plate format. Talk to your doctor, a dietitian, or a diabetes educator about your concerns. Carbohydrate counting  With carbohydrate counting, you plan meals based on the amount of carbohydrate in each food. Carbohydrate raises blood sugar higher and more quickly than any other nutrient. It is found in desserts, breads and cereals, and fruit. It's also found in starchy vegetables such as potatoes and corn, grains such as rice and pasta, and milk and yogurt. Spreading carbohydrate throughout the day helps keep your blood sugar levels within your target range. Your daily amount depends on several things, including your weight, how active you are, which diabetes medicines you take, and what your goals are for your blood sugar levels. A registered dietitian or diabetes educator can help you plan how much carbohydrate to include in each meal and snack. A guideline for your daily amount of carbohydrate is:  · 45 to 60 grams at each meal. That's about the same as 3 to 4 carbohydrate servings. · 15 to 20 grams at each snack. That's about the same as 1 carbohydrate serving. The Nutrition Facts label on packaged foods tells you how much carbohydrate is in a serving of the food. First, look at the serving size on the food label. Is that the amount you eat in a serving? All of the nutrition information on a food label is based on that serving size. So if you eat more or less than that, you'll need to adjust the other numbers. Total carbohydrate is the next thing you need to look for on the label. If you count carbohydrate servings, one serving of carbohydrate is 15 grams. For foods that don't come with labels, such as fresh fruits and vegetables, you'll need a guide that lists carbohydrate in these foods. Ask your doctor, dietitian, or diabetes educator about books or other nutrition guides you can use.   If you take insulin, you need to know how many grams of carbohydrate are in a meal. This lets you know how much rapid-acting insulin to take before you eat. If you use an insulin pump, you get a constant rate of insulin during the day. So the pump must be programmed at meals to give you extra insulin to cover the rise in blood sugar after meals. When you know how much carbohydrate you will eat, you can take the right amount of insulin. Or, if you always use the same amount of insulin, you need to make sure that you eat the same amount of carbohydrate at meals. If you need more help to understand carbohydrate counting and food labels, ask your doctor, dietitian, or diabetes educator. How can you plan healthy meals? Here are some tips to get started:  · Plan your meals a week at a time. Don't forget to include snacks too. · Use cookbooks or online recipes to plan several main meals. Plan some quick meals for busy nights. You also can double some recipes that freeze well. Then you can save half for other busy nights when you don't have time to cook. · Make sure you have the ingredients you need for your recipes. If you're running low on basic items, put these items on your shopping list too. · List foods that you use to make breakfasts, lunches, and snacks. List plenty of fruits and vegetables. · Post this list on the refrigerator. Add to it as you think of more things you need. · Take the list to the store to do your weekly shopping. Follow-up care is a key part of your treatment and safety. Be sure to make and go to all appointments, and call your doctor if you are having problems. It's also a good idea to know your test results and keep a list of the medicines you take. Where can you learn more? Go to https://luis.[email or web address] org and sign in to your [employer] account. Enter K693 in the [facility] box to learn more about \"Learning About Meal Planning for Diabetes. \"     If you do not have an account, please click on the \"Sign Up Now\" link.   Current as of: December 17, 2020               Content Version: 13.0  © 2006-2021 [employer]. Care instructions adapted under license by [facility] ([facility]). If you have questions about a medical condition or this instruction, always ask your healthcare professional. [address] any warranty or liability for your use of this information. Patient Education        Learning About Diabetes and Exercise  Can you exercise if you have diabetes? When you have diabetes, it's important to get regular exercise. It can help you manage your blood sugar level. You can still play sports, run, ride a bike, swim, and do other activities when you have diabetes. How does exercise help when you have diabetes? Getting regular exercise can help control your blood sugar. Your body turns the food you eat into glucose, a type of sugar. You need this sugar for energy. When you have diabetes, the sugar builds up in your blood. But when you exercise, your body uses sugar. This helps keep it from building up in your blood and results in lower blood sugar and better control of diabetes. Exercise may help you in other ways too. It can help you reach and stay at a healthy weight. It also helps improve blood pressure and cholesterol, which can reduce the risk of heart disease. Exercise can make you feel stronger and happier. It can help you relax and sleep better. And it can give you confidence in other things you do. Exercising safely when you have diabetes  Before you start a new exercise program, talk to your doctor about how and when to exercise. Some types of exercise can be harmful if your diabetes is causing other problems, such as problems with your feet. Your doctor can tell you what types of exercise are good choices for you. Here are some general safety tips. · Take steps to avoid blood sugar problems. ? Check your blood sugar before and after you exercise. ?  Ask your doctor what blood sugar range is safe for you when you exercise. ? If you take medicine or insulin that lowers blood sugar, check your blood sugar before you exercise. ? If your blood sugar is less than 90 mg/dL, you may need to eat a carbohydrate snack first.  ? Be careful when you exercise if your blood sugar is too high. Make sure to drink plenty of water. · Try to exercise at about the same time each day. This may help keep your blood sugar steady. If you want to exercise more, slowly increase how hard or long you exercise. · Have someone with you when you exercise. Or exercise at a gym. You may need help if your blood sugar drops too low. · Keep some quick-sugar food with you. You may get symptoms of low blood sugar during exercise or up to 24 hours later. · Use proper footwear and the right equipment. · Pay attention to your body. If you are used to exercising and notice that you cannot do as much as usual, talk to your doctor. Follow-up care is a key part of your treatment and safety. Be sure to make and go to all appointments, and call your doctor if you are having problems. It's also a good idea to know your test results and keep a list of the medicines you take. Where can you learn more? Go to https://[employer] for Smartphones.[employer]api. org and sign in to your [employer] account. Enter N395 in the [facility] box to learn more about \"Learning About Diabetes and Exercise. \"     If you do not have an account, please click on the \"Sign Up Now\" link. Current as of: August 31, 2020               Content Version: 13.0  © 2006-2021 [employer]. Care instructions adapted under license by [facility] [place] ([facility]). If you have questions about a medical condition or this instruction, always ask your healthcare professional. [place] [address] any warranty or liability for your use of this information.          Patient Education        influenza virus vaccine (injection)  Pronunciation:  IN floo SLADE Kirkpatrick Backers seen  Brand:  Afluria PF Pediatric Quadrivalent [id number], Afluria PF Quadrivalent [id number], Afluria Quadrivalent [id number], Fluad PF [id number], Fluad Quadrivalent PF 2320-0268, Fluarix PF Quadrivalent [id number], Flublok Quadrivalent [id number], Flucelvax PF Quadrivalent [id number], Flucelvax Quadrivalent [id number], FluLaval PF Quadrivalent [id number], Fluzone High-Dose Quadrivalent PF [id number], Fluzone PF Quadrivalent [id number], Fluzone Quadrivalent 9935-6051  What is the most important information I should know about this vaccine? The injectable influenza virus vaccine (flu shot) is a \"killed virus\" vaccine. Influenza virus vaccine is also available in a nasal spray form, which is a \"live virus\" vaccine. This medication guide addresses only the injectable form of this vaccine. Becoming infected with influenza is much more dangerous to your health than receiving this vaccine. However, like any medicine, this vaccine can cause side effects but the risk of serious side effects is extremely low. What is influenza virus vaccine? Influenza virus (commonly known as \"the flu\") is a serious disease caused by a virus. Influenza virus can spread from one person to another through small droplets of saliva that are expelled into the air when an infected person coughs or sneezes. The virus can also be passed through contact with objects the infected person has touched, such as a door handle or other surfaces. Influenza virus vaccine is used to prevent infection caused by influenza virus. The vaccine is redeveloped each year to contain specific strains of inactivated (killed) flu virus that are recommended by public health officials for that year. The injectable influenza virus vaccine (flu shot) is a \"killed virus\" vaccine. Influenza virus vaccine is also available in a nasal spray form, which is a \"live virus\" vaccine.   Influenza virus vaccine works by exposing you to a small dose of the virus, which helps your body to develop immunity to the disease. Influenza virus vaccine will not treat an active infection that has already developed in the body. Influenza virus vaccine is for use in adults and children who are at least 7 months old. Becoming infected with influenza is much more dangerous to your health than receiving this vaccine. Influenza causes thousands of deaths each year, and hundreds of thousands of hospitalizations. However, like any medicine, this vaccine can cause side effects but the risk of serious side effects is extremely low. Like any vaccine, influenza virus vaccine may not provide protection from disease in every person. This vaccine will not prevent illness caused by helen flu (\"bird flu\"). What should I discuss with my healthcare provider before receiving this vaccine? You may not be able to receive this vaccine if you are allergic to eggs, or if you have:  · a history of severe allergic reaction to a flu vaccine; or  · a history of Guillain-Frohna syndrome (within 6 weeks after receiving a flu vaccine). Tell your doctor if you have ever had:  · bleeding problems;  · a neurologic disorder or disease affecting the brain (or if this was a reaction to a previous vaccine);  · seizures;  · a weak immune system caused by disease, bone marrow transplant, or by using certain medicines or receiving cancer treatments; or  · an allergy to latex. You can still receive a vaccine if you have a minor cold. If you have a severe illness with a fever or any type of infection, wait until you get better before receiving this vaccine. The Centers for Disease Control and Prevention recommends that pregnant women get a flu shot during any trimester of pregnancy to protect themselves and their  babies from flu. The nasal spray form of influenza vaccine is not recommended for use in pregnant women. It may not be safe to breastfeed while using this medicine. Ask your doctor about any risk.   This vaccine should not be given to a child younger than 7 months old. How is this vaccine given? Some brands of this vaccine are made for use in adults and not in children. Your child's doctor can recommend the best influenza virus vaccine for your child. This vaccine is given as an injection (shot) into a muscle. You will receive this injection in a doctor's office or other clinic setting. You should receive a flu vaccine every year. Your immunity will gradually decrease over the 12 months after you receive the influenza virus vaccine. Children receiving this vaccine may need a booster shot one month after receiving the first vaccine. The influenza virus vaccine is usually given in October or November. Some people may need to have their vaccines earlier or later. Follow your doctor's instructions. Your doctor may recommend treating fever and pain with an aspirin-free pain reliever such as acetaminophen (Tylenol) or ibuprofen (Motrin, Advil, and others) when the shot is given and for the next 24 hours. Follow the label directions or your doctor's instructions about how much of this medicine to give your child. It is especially important to prevent fever from occurring in a child who has a seizure disorder such as epilepsy. What happens if I miss a dose? Since flu shots are usually given only one time per year, you will most likely not be on a dosing schedule. Call your doctor if you forget to receive your yearly flu shot in October or November. If your child misses a booster dose of this vaccine, call your doctor for instructions. What happens if I overdose? An overdose of this vaccine is unlikely to occur. What should I avoid before or after receiving this vaccine? Follow your doctor's instructions about any restrictions on food, beverages, or activity. What are the possible side effects of influenza virus injectable vaccine?   Get emergency medical help if you have signs of an allergic reaction: hives; difficulty breathing; swelling of your face, lips, tongue, or throat. You should not receive a booster vaccine if you had a life-threatening allergic reaction after the first shot. Keep track of any and all side effects you have after receiving this vaccine. If you ever need to receive influenza virus vaccine in the future, you will need to tell your doctor if the previous shot caused any side effects. Influenza virus injectable (killed virus) vaccine will not cause you to become ill with the flu virus that it contains. However, you may have flu-like symptoms at any time during flu season that may be caused by other strains of influenza virus. Call your doctor at once if you have:  · a light-headed feeling, like you might pass out;  · severe weakness or unusual feeling in your arms and legs (may occur 2 to 4 weeks after you receive the vaccine);  · high fever;  · seizure (convulsions); or  · unusual bleeding. Common side effects may include:  · low fever, chills;  · mild fussiness or crying;  · redness, bruising, pain, swelling, or a lump where the vaccine was injected;  · headache, tired feeling; or  · joint or muscle pain. This is not a complete list of side effects and others may occur. Call your doctor for medical advice about side effects. You may report vaccine side effects to the [address] [place] [address] and Human Services at [phone]. What other drugs will affect influenza virus injectable vaccine?   If you are using any of these medications, you may not be able to receive the vaccine, or may need to wait until the other treatments are finished:  · phenytoin, theophylline, or warfarin (Coumadin, Jantoven);  · an oral, nasal, inhaled, or injectable steroid medicine;  · medications to treat psoriasis, rheumatoid arthritis, or other autoimmune disorders--azathioprine, etanercept, leflunomide, and others; or  · medicines to treat or prevent organ transplant rejection--basiliximab, cyclosporine, muromonab-CD3, mycophenolate mofetil, sirolimus, tacrolimus. This list is not complete. Other drugs may affect influenza virus vaccine, including prescription and over-the-counter medicines, vitamins, and herbal products. Not all possible drug interactions are listed here. Where can I get more information? Your doctor or pharmacist can provide more information about this vaccine. Additional information is available from your local health department or the Centers for Disease Control and Prevention. Remember, keep this and all other medicines out of the reach of children, never share your medicines with others, and use this medication only for the indication prescribed. Every effort has been made to ensure that the information provided by [address] is accurate, up-to-date, and complete, but no guarantee is made to that effect. Drug information contained herein may be time sensitive. [facility] information has been compiled for use by healthcare practitioners and consumers in the [place] and therefore [facility] does not warrant that uses outside of the [place] are appropriate, unless specifically indicated otherwise. [facility]'s drug information does not endorse drugs, diagnose patients or recommend therapy. [facility] drug information is an informational resource designed to assist licensed healthcare practitioners in caring for their patients and/or to serve consumers viewing this service as a supplement to, and not a substitute for, the expertise, skill, knowledge and judgment of healthcare practitioners. The absence of a warning for a given drug or drug combination in no way should be construed to indicate that the drug or drug combination is safe, effective or appropriate for any given patient. [facility] does not assume any responsibility for any aspect of healthcare administered with the aid of information [facility] provides.  The information contained herein is not intended to cover all possible uses, directions, precautions, warnings, drug interactions, allergic reactions, or adverse effects. If you have questions about the drugs you are taking, check with your doctor, nurse or pharmacist.  Copyright 1661-3600 [address]. Version: 8.03. Revision date: 8/19/2020. Care instructions adapted under license by [facility] ([facility]). If you have questions about a medical condition or this instruction, always ask your healthcare professional. [place] [address] any warranty or liability for your use of this information. Patient Education        sulfamethoxazole and trimethoprim (oral/injection)  Pronunciation:  SUL fa meth OX a zole and trye METH oh prim  Brand:  Bactrim, Bactrim DS, Bactrim I.V., Septra I.V., SMZ-TMP DS, Sulfatrim Pediatric  What is the most important information I should know about sulfamethoxazole and trimethoprim? You should not use this medicine if you have severe liver disease, kidney disease that is not being monitored, anemia caused by folic acid deficiency, if you take dofetilide, or if you have had low platelets caused by using trimethoprim or a sulfa drug. You should not take sulfamethoxazole and trimethoprim if you are pregnant or breastfeeding. What is sulfamethoxazole and trimethoprim? Sulfamethoxazole and trimethoprim is a combination antibiotic used to treat ear infections, urinary tract infections, bronchitis, traveler's diarrhea, shigellosis, and Pneumocystis jiroveci pneumonia. Sulfamethoxazole and trimethoprim may also be used for purposes not listed in this medication guide. What should I discuss with my healthcare provider before using sulfamethoxazole and trimethoprim?   You should not use this medicine if you are allergic to sulfamethoxazole or trimethoprim, or if you have:  · severe liver disease;  · kidney disease that is not being treated or monitored;  · anemia (low red blood cells) caused by folic acid deficiency;  · a history of low blood platelets after taking trimethoprim or any sulfa drug; or  · if you take dofetilide. Sulfamethoxazole and trimethoprim may harm an unborn baby or cause birth defects. Tell your doctor if you are pregnancy or if you become pregnant. Do not breastfeed while using this medicine. This medicine should not be given to a child younger than 3 months old. Tell your doctor if you have ever had:  · kidney or liver disease;  · a folate (folic acid) deficiency;  · asthma or severe allergies;  · a thyroid disorder;  · malnourishment;  · alcoholism;  · high levels of potassium in your blood;  · porphyria, or glucose-6-phosphate dehydrogenase (G6PD) deficiency; or  · if you use a blood thinner (such as warfarin) and you have routine \"INR\" or prothrombin time tests. How should I use sulfamethoxazole and trimethoprim? This medicine is taken by mouth (oral) or given as an infusion into a vein (injection). Follow all directions on your prescription label and read all medication guides or instruction sheets. Use the medicine exactly as directed. Shake the oral suspension (liquid) before you measure a dose. Use the dosing syringe provided, or use a medicine dose-measuring device (not a kitchen spoon). A healthcare provider will give the first injection and may teach you how to properly use the medication by yourself. When using injections by yourself, be sure you understand how to properly mix and store the medicine. Ask your doctor or pharmacist if you don't understand all instructions. Drink plenty of fluids to prevent kidney stones while you are using this medicine. Sulfamethoxazole and trimethoprim doses are based on weight in children. Use only the recommended dose when giving this medicine to a child. Use this medicine for the full prescribed length of time, even if your symptoms quickly improve. Skipping doses can increase your risk of infection that is resistant to medication.  This medicine will not treat a viral infection such as the flu or a common cold. You may need frequent medical tests. This medicine can affect the results of certain medical tests. Tell any doctor who treats you that you are using sulfamethoxazole and trimethoprim. Store at room temperature away from moisture, heat, and light. What happens if I miss a dose? Use the medicine as soon as you can, but skip the missed dose if it is almost time for your next dose. Do not use two doses at one time. What happens if I overdose? Seek emergency medical attention or call the Poison Help line at 1-571.695.7966. Overdose symptoms may include loss of appetite, vomiting, fever, blood in your urine, yellowing of your skin or eyes, confusion, or loss of consciousness. What should I avoid while using sulfamethoxazole and trimethoprim? Antibiotic medicines can cause diarrhea, which may be a sign of a new infection. If you have diarrhea that is watery or bloody, call your doctor before using anti-diarrhea medicine. If you use the injection form of this medicine, do not eat or drink anything that contains propylene glycol (an ingredient in many processed foods, soft drinks, and medicines). Dangerous effects could occur. Sulfamethoxazole and trimethoprim could make you sunburn more easily. Avoid sunlight or tanning beds. Wear protective clothing and use sunscreen (SPF 30 or higher) when you are outdoors. What are the possible side effects of sulfamethoxazole and trimethoprim? Get emergency medical help if you have signs of an allergic reaction (hives, cough, chest pain, shortness of breath, swelling in your face or throat) or a severe skin reaction (fever, sore throat, burning eyes, skin pain, red or purple skin rash with blistering and peeling). Seek medical treatment if you have a serious drug reaction that can affect many parts of your body.  Symptoms may include: skin rash, fever, swollen glands, joint pain, muscle aches, severe weakness, pale skin, unusual bruising, or over-the-counter medicines, vitamins, and herbal products. Not all possible drug interactions are listed here. Where can I get more information? Your pharmacist can provide more information about sulfamethoxazole and trimethoprim. Remember, keep this and all other medicines out of the reach of children, never share your medicines with others, and use this medication only for the indication prescribed. Every effort has been made to ensure that the information provided by [address] [place], [place] [address] is accurate, up-to-date, and complete, but no guarantee is made to that effect. Drug information contained herein may be time sensitive. [facility] information has been compiled for use by healthcare practitioners and consumers in the [place] and therefore [facility] does not warrant that uses outside of the [place] are appropriate, unless specifically indicated otherwise. [facility]'s drug information does not endorse drugs, diagnose patients or recommend therapy. [facility]'s drug information is an informational resource designed to assist licensed healthcare practitioners in caring for their patients and/or to serve consumers viewing this service as a supplement to, and not a substitute for, the expertise, skill, knowledge and judgment of healthcare practitioners. The absence of a warning for a given drug or drug combination in no way should be construed to indicate that the drug or drug combination is safe, effective or appropriate for any given patient. [facility] does not assume any responsibility for any aspect of healthcare administered with the aid of information [facility] provides. The information contained herein is not intended to cover all possible uses, directions, precautions, warnings, drug interactions, allergic reactions, or adverse effects. If you have questions about the drugs you are taking, check with your doctor, nurse or pharmacist.  Copyright 5520-4410 [employer] [address] [place] [address]: 11.01.  Revision date: 2/12/2021. Care instructions adapted under license by [facility] ([facility]). If you have questions about a medical condition or this instruction, always ask your healthcare professional. [address] any warranty or liability for your use of this information.

## 2021-10-01 LAB
CULTURE: ABNORMAL
Lab: ABNORMAL
SPECIMEN DESCRIPTION: ABNORMAL

## 2021-10-07 ENCOUNTER — HOSPITAL ENCOUNTER (OUTPATIENT)
Dept: MAMMOGRAPHY | Age: 56
Discharge: HOME | End: 2021-10-09
Payer: COMMERCIAL

## 2021-10-07 DIAGNOSIS — Z12.31 OTHER SCREENING MAMMOGRAM: ICD-10-CM

## 2021-10-07 PROCEDURE — 77067 SCR MAMMO BI INCL CAD: CPT

## 2022-02-16 RX ORDER — LISINOPRIL AND HYDROCHLOROTHIAZIDE 12.5; 1 MG/1; MG/1
TABLET ORAL
Qty: 90 TABLET | Refills: 3 | Status: SHIPPED | OUTPATIENT
Start: 2022-02-16

## 2022-02-16 NOTE — TELEPHONE ENCOUNTER
Pharmacy requesting a refill of the below medication which has been pended for you:     Requested Prescriptions     Pending Prescriptions Disp Refills    lisinopril-hydroCHLOROthiazide (PRINZIDE;ZESTORETIC) 10-12.5 MG per tablet 90 tablet 3     Sig: TAKE 1 TABLET BY MOUTH ONE TIME A DAY    metFORMIN (GLUCOPHAGE) 500 MG tablet 90 tablet 3     Sig: TAKE 1 TABLET BY MOUTH ONE TIME A DAY       Last Appointment Date: 9/30/2021  Next Appointment Date: 3/24/2022    No Known Allergies

## 2022-03-19 ENCOUNTER — HOSPITAL ENCOUNTER (OUTPATIENT)
Dept: LAB | Age: 57
Discharge: HOME | End: 2022-03-19
Payer: COMMERCIAL

## 2022-03-19 DIAGNOSIS — E03.4 HYPOTHYROIDISM DUE TO ACQUIRED ATROPHY OF THYROID: ICD-10-CM

## 2022-03-19 DIAGNOSIS — R73.01 ELEVATED FASTING GLUCOSE: ICD-10-CM

## 2022-03-19 DIAGNOSIS — Z00.00 ADULT GENERAL MEDICAL EXAMINATION: ICD-10-CM

## 2022-03-19 LAB
ABSOLUTE EOS #: 0.12 K/UL (ref 0–0.44)
ABSOLUTE IMMATURE GRANULOCYTE: <0.03 K/UL (ref 0–0.3)
ABSOLUTE LYMPH #: 2.71 K/UL (ref 1.1–3.7)
ABSOLUTE MONO #: 0.47 K/UL (ref 0.1–1.2)
ALBUMIN SERPL-MCNC: 4.5 G/DL (ref 3.5–5.2)
ALBUMIN/GLOBULIN RATIO: 1.5 (ref 1–2.5)
ALP BLD-CCNC: 82 U/L (ref 35–104)
ALT SERPL-CCNC: 35 U/L (ref 5–33)
ANION GAP SERPL CALCULATED.3IONS-SCNC: 12 MMOL/L (ref 9–17)
AST SERPL-CCNC: 23 U/L
BASOPHILS # BLD: 1 % (ref 0–2)
BASOPHILS ABSOLUTE: 0.05 K/UL (ref 0–0.2)
BILIRUB SERPL-MCNC: 0.73 MG/DL (ref 0.3–1.2)
BILIRUBIN DIRECT: 0.13 MG/DL
BILIRUBIN, INDIRECT: 0.6 MG/DL (ref 0–1)
BUN BLDV-MCNC: 14 MG/DL (ref 6–20)
BUN/CREAT BLD: 18 (ref 9–20)
CALCIUM SERPL-MCNC: 9.5 MG/DL (ref 8.6–10.4)
CHLORIDE BLD-SCNC: 100 MMOL/L (ref 98–107)
CHOLESTEROL, FASTING: 171 MG/DL
CHOLESTEROL/HDL RATIO: 3.6
CO2: 25 MMOL/L (ref 20–31)
CREAT SERPL-MCNC: 0.8 MG/DL (ref 0.5–0.9)
EOSINOPHILS RELATIVE PERCENT: 2 % (ref 1–4)
GFR AFRICAN AMERICAN: >60 ML/MIN
GFR NON-AFRICAN AMERICAN: >60 ML/MIN
GFR SERPL CREATININE-BSD FRML MDRD: ABNORMAL ML/MIN/{1.73_M2}
GLOBULIN: 3.1 G/DL (ref 1.5–3.8)
GLUCOSE BLD-MCNC: 125 MG/DL (ref 70–99)
HCT VFR BLD CALC: 45.1 % (ref 36.3–47.1)
HDLC SERPL-MCNC: 47 MG/DL
HEMOGLOBIN: 15 G/DL (ref 11.9–15.1)
IMMATURE GRANULOCYTES: 0 %
LDL CHOLESTEROL: 95 MG/DL (ref 0–130)
LYMPHOCYTES # BLD: 39 % (ref 24–43)
MCH RBC QN AUTO: 28.7 PG (ref 25.2–33.5)
MCHC RBC AUTO-ENTMCNC: 33.3 G/DL (ref 25.2–33.5)
MCV RBC AUTO: 86.2 FL (ref 82.6–102.9)
MONOCYTES # BLD: 7 % (ref 3–12)
NRBC AUTOMATED: 0 PER 100 WBC
PDW BLD-RTO: 13.3 % (ref 11.8–14.4)
PLATELET # BLD: 203 K/UL (ref 138–453)
PMV BLD AUTO: 10.2 FL (ref 8.1–13.5)
POTASSIUM SERPL-SCNC: 4.4 MMOL/L (ref 3.7–5.3)
RBC # BLD: 5.23 M/UL (ref 3.95–5.11)
SEG NEUTROPHILS: 51 % (ref 36–65)
SEGMENTED NEUTROPHILS ABSOLUTE COUNT: 3.51 K/UL (ref 1.5–8.1)
SODIUM BLD-SCNC: 137 MMOL/L (ref 135–144)
THYROXINE, FREE: 1.05 NG/DL (ref 0.93–1.7)
TOTAL PROTEIN: 7.6 G/DL (ref 6.4–8.3)
TRIGLYCERIDE, FASTING: 143 MG/DL
TSH SERPL DL<=0.05 MIU/L-ACNC: 1.1 MIU/L (ref 0.3–5)
WBC # BLD: 6.9 K/UL (ref 3.5–11.3)

## 2022-03-19 PROCEDURE — 84439 ASSAY OF FREE THYROXINE: CPT

## 2022-03-19 PROCEDURE — 85025 COMPLETE CBC W/AUTO DIFF WBC: CPT

## 2022-03-19 PROCEDURE — 80061 LIPID PANEL: CPT

## 2022-03-19 PROCEDURE — 80076 HEPATIC FUNCTION PANEL: CPT

## 2022-03-19 PROCEDURE — 83036 HEMOGLOBIN GLYCOSYLATED A1C: CPT

## 2022-03-19 PROCEDURE — 80048 BASIC METABOLIC PNL TOTAL CA: CPT

## 2022-03-19 PROCEDURE — 36415 COLL VENOUS BLD VENIPUNCTURE: CPT

## 2022-03-19 PROCEDURE — 84443 ASSAY THYROID STIM HORMONE: CPT

## 2022-03-20 LAB
ESTIMATED AVERAGE GLUCOSE: 123 MG/DL
HBA1C MFR BLD: 5.9 % (ref 4–6)

## 2022-10-25 ENCOUNTER — HOSPITAL ENCOUNTER (OUTPATIENT)
Dept: MAMMOGRAPHY | Age: 57
Discharge: HOME | End: 2022-10-27
Payer: COMMERCIAL

## 2022-10-25 DIAGNOSIS — Z12.31 ENCOUNTER FOR SCREENING MAMMOGRAM FOR MALIGNANT NEOPLASM OF BREAST: ICD-10-CM

## 2022-10-25 PROCEDURE — 77067 SCR MAMMO BI INCL CAD: CPT

## 2022-12-08 RX ORDER — LISINOPRIL AND HYDROCHLOROTHIAZIDE 12.5; 1 MG/1; MG/1
TABLET ORAL
Qty: 90 TABLET | Refills: 3 | OUTPATIENT
Start: 2022-12-08

## 2023-04-04 PROBLEM — E66.01 SEVERE OBESITY (BMI 35.0-39.9) WITH COMORBIDITY (HCC): Status: ACTIVE | Noted: 2023-04-04

## 2023-04-04 PROBLEM — E11.9 CONTROLLED TYPE 2 DIABETES MELLITUS WITHOUT COMPLICATION, WITHOUT LONG-TERM CURRENT USE OF INSULIN (HCC): Status: ACTIVE | Noted: 2023-04-04

## 2023-04-04 PROBLEM — E03.4 HYPOTHYROIDISM DUE TO ACQUIRED ATROPHY OF THYROID: Status: ACTIVE | Noted: 2023-04-04

## 2023-09-07 ENCOUNTER — HOSPITAL ENCOUNTER (OUTPATIENT)
Dept: CT IMAGING | Age: 58
Discharge: HOME | End: 2023-09-07
Payer: COMMERCIAL

## 2023-09-07 DIAGNOSIS — R10.31 RLQ ABDOMINAL PAIN: ICD-10-CM

## 2023-09-07 LAB
CREAT BLD-MCNC: 0.7 MG/DL (ref 0.5–1.2)
GFR SERPL CREATININE-BSD FRML MDRD: > 60 ML/MIN/1.73M2

## 2023-09-07 PROCEDURE — 74177 CT ABD & PELVIS W/CONTRAST: CPT

## 2023-09-07 PROCEDURE — 82565 ASSAY OF CREATININE: CPT

## 2023-09-07 PROCEDURE — 6360000004 HC RX CONTRAST MEDICATION: Performed by: NURSE PRACTITIONER

## 2023-09-07 RX ADMIN — IOPAMIDOL 100 ML: 755 INJECTION, SOLUTION INTRAVENOUS at 07:42

## 2023-09-07 RX ADMIN — IOHEXOL 50 ML: 240 INJECTION, SOLUTION INTRATHECAL; INTRAVASCULAR; INTRAVENOUS; ORAL at 07:42

## 2023-09-11 ENCOUNTER — OFFICE VISIT (OUTPATIENT)
Dept: SURGERY | Age: 58
End: 2023-09-11
Payer: COMMERCIAL

## 2023-09-11 VITALS
TEMPERATURE: 97 F | HEIGHT: 64 IN | OXYGEN SATURATION: 99 % | SYSTOLIC BLOOD PRESSURE: 142 MMHG | RESPIRATION RATE: 18 BRPM | WEIGHT: 217.7 LBS | HEART RATE: 80 BPM | BODY MASS INDEX: 37.17 KG/M2 | DIASTOLIC BLOOD PRESSURE: 88 MMHG

## 2023-09-11 DIAGNOSIS — E66.01 CLASS 2 SEVERE OBESITY WITH SERIOUS COMORBIDITY AND BODY MASS INDEX (BMI) OF 37.0 TO 37.9 IN ADULT, UNSPECIFIED OBESITY TYPE (HCC): ICD-10-CM

## 2023-09-11 DIAGNOSIS — K38.9 APPENDIX DISEASE: Primary | ICD-10-CM

## 2023-09-11 DIAGNOSIS — I10 ESSENTIAL HYPERTENSION: ICD-10-CM

## 2023-09-11 PROCEDURE — 3074F SYST BP LT 130 MM HG: CPT | Performed by: SURGERY

## 2023-09-11 PROCEDURE — 3078F DIAST BP <80 MM HG: CPT | Performed by: SURGERY

## 2023-09-11 PROCEDURE — 99204 OFFICE O/P NEW MOD 45 MIN: CPT | Performed by: SURGERY

## 2023-09-11 NOTE — PROGRESS NOTES
Hortensia Almaguer MD   General Surgery  New Patient Evaluation in Office  Pt Name: Elie Farias  Date of Birth 1965   Today's Date: 9/11/2023  Medical Record Number: [id number]  Referring Provider: Dr. Darlene Mon  Primary Care Provider: KAYDEN Hwang - CNP  Chief Complaint:  Chief Complaint   Patient presents with    Surgical Consult     NP Dr. Jasen Lubin appendix       ASSESSMENT      1. Appendix disease    2. Essential hypertension    3. Class 2 severe obesity with serious comorbidity and body mass index (BMI) of 37.0 to 37.9 in adult, unspecified obesity type ([address])    4. Incidental pulmonary nodule     PLANS      CT imaging reviewed. Mildly dilated appendix at 1 cm without. Appendiceal inflammation. Symptoms of right lower quadrant right flank pain resolved. Colonoscopy performed 2 years ago. Records reviewed. Colonoscopy reported to cecum no mass seen. Incidental pulmonary nodule on imaging 3-month follow-up imaging recommended. Patient offered appendectomy on the basis of dilation on CT scan. Potential for no abnormal pathology found on imaging. Could opt for follow-up CT scan evaluate for any abnormality at the base of the appendix. Patient reluctant for any surgery she does not want a repeat colonoscopy at this time. Discussed also Cologuard. This point patient is asymptomatic she does not really want to do anything. I would recommend she follow-up in a month then call in interval with any symptoms. If patient desires no intervention in interval recommend follow-up CT scan, if no symptoms, in 3 months along with CT scan of the chest.  However, she simply desires to proceed with appendectomy could proceed in that manner as well. Removal of appendix only way to ultimately determine pathology. She could consider Cologuard testing also as alternative negative result would be reassuring. Patient declined at this time. Colonoscopy records and pathology reviewed.   7.

## 2023-09-12 ASSESSMENT — ENCOUNTER SYMPTOMS
VOMITING: 0
SORE THROAT: 0
SHORTNESS OF BREATH: 0
TROUBLE SWALLOWING: 0
NAUSEA: 1
VOICE CHANGE: 0
BLOOD IN STOOL: 0
ABDOMINAL PAIN: 1
COLOR CHANGE: 0
WHEEZING: 0
DIARRHEA: 1
COUGH: 0

## 2023-11-18 ENCOUNTER — HOSPITAL ENCOUNTER (OUTPATIENT)
Dept: MAMMOGRAPHY | Age: 58
End: 2023-11-18
Payer: COMMERCIAL

## 2023-11-18 DIAGNOSIS — Z12.31 ENCOUNTER FOR SCREENING MAMMOGRAM FOR MALIGNANT NEOPLASM OF BREAST: ICD-10-CM

## 2023-11-18 PROCEDURE — 77063 BREAST TOMOSYNTHESIS BI: CPT

## 2023-11-18 PROCEDURE — 77067 SCR MAMMO BI INCL CAD: CPT

## 2024-04-16 PROBLEM — L40.9 PSORIASIS: Status: ACTIVE | Noted: 2024-04-16

## 2024-04-22 ENCOUNTER — HOSPITAL ENCOUNTER (OUTPATIENT)
Age: 59
Discharge: HOME | End: 2024-04-22
Payer: COMMERCIAL

## 2024-04-22 ENCOUNTER — HOSPITAL ENCOUNTER (OUTPATIENT)
Dept: GENERAL RADIOLOGY | Age: 59
Discharge: HOME | End: 2024-04-24
Payer: COMMERCIAL

## 2024-04-22 DIAGNOSIS — R53.83 OTHER FATIGUE: ICD-10-CM

## 2024-04-22 DIAGNOSIS — M25.50 ARTHRALGIA, UNSPECIFIED JOINT: ICD-10-CM

## 2024-04-22 DIAGNOSIS — Z00.00 WELLNESS EXAMINATION: ICD-10-CM

## 2024-04-22 DIAGNOSIS — R79.82 CRP ELEVATED: ICD-10-CM

## 2024-04-22 DIAGNOSIS — E11.9 TYPE 2 DIABETES MELLITUS WITHOUT COMPLICATION, WITHOUT LONG-TERM CURRENT USE OF INSULIN (HCC): ICD-10-CM

## 2024-04-22 DIAGNOSIS — M54.50 CHRONIC MIDLINE LOW BACK PAIN WITHOUT SCIATICA: ICD-10-CM

## 2024-04-22 DIAGNOSIS — G89.29 CHRONIC MIDLINE LOW BACK PAIN WITHOUT SCIATICA: ICD-10-CM

## 2024-04-22 DIAGNOSIS — E03.4 HYPOTHYROIDISM DUE TO ACQUIRED ATROPHY OF THYROID: ICD-10-CM

## 2024-04-22 DIAGNOSIS — L65.9 HAIR LOSS: ICD-10-CM

## 2024-04-22 DIAGNOSIS — I10 ESSENTIAL HYPERTENSION: ICD-10-CM

## 2024-04-22 DIAGNOSIS — E66.01 SEVERE OBESITY (BMI 35.0-39.9) WITH COMORBIDITY (HCC): ICD-10-CM

## 2024-04-22 LAB
25(OH)D3 SERPL-MCNC: 30 NG/ML (ref 30–100)
ALBUMIN SERPL-MCNC: 4.2 G/DL (ref 3.5–5.2)
ALBUMIN/GLOB SERPL: 1.4 {RATIO} (ref 1–2.5)
ALP SERPL-CCNC: 74 U/L (ref 35–104)
ALT SERPL-CCNC: 41 U/L (ref 5–33)
ANION GAP SERPL CALCULATED.3IONS-SCNC: 11 MMOL/L (ref 9–17)
AST SERPL-CCNC: 23 U/L
BASOPHILS # BLD: 0.05 K/UL (ref 0–0.2)
BASOPHILS NFR BLD: 1 % (ref 0–2)
BILIRUB SERPL-MCNC: 0.6 MG/DL (ref 0.3–1.2)
BUN SERPL-MCNC: 11 MG/DL (ref 6–20)
BUN/CREAT SERPL: 14 (ref 9–20)
CALCIUM SERPL-MCNC: 9.4 MG/DL (ref 8.6–10.4)
CHLORIDE SERPL-SCNC: 100 MMOL/L (ref 98–107)
CHOLEST SERPL-MCNC: 196 MG/DL (ref 0–199)
CHOLESTEROL/HDL RATIO: 5
CO2 SERPL-SCNC: 26 MMOL/L (ref 20–31)
CREAT SERPL-MCNC: 0.8 MG/DL (ref 0.5–0.9)
CRP SERPL HS-MCNC: 8.9 MG/L (ref 0–5)
EOSINOPHIL # BLD: 0.11 K/UL (ref 0–0.44)
EOSINOPHILS RELATIVE PERCENT: 2 % (ref 1–4)
ERYTHROCYTE [DISTWIDTH] IN BLOOD BY AUTOMATED COUNT: 13.5 % (ref 11.8–14.4)
ERYTHROCYTE [SEDIMENTATION RATE] IN BLOOD BY PHOTOMETRIC METHOD: 8 MM/HR (ref 0–30)
GFR SERPL CREATININE-BSD FRML MDRD: 85 ML/MIN/1.73M2
GLUCOSE SERPL-MCNC: 144 MG/DL (ref 70–99)
HCT VFR BLD AUTO: 43.2 % (ref 36.3–47.1)
HDLC SERPL-MCNC: 39 MG/DL
HGB BLD-MCNC: 14.5 G/DL (ref 11.9–15.1)
IMM GRANULOCYTES # BLD AUTO: <0.03 K/UL (ref 0–0.3)
IMM GRANULOCYTES NFR BLD: 0 %
LDLC SERPL CALC-MCNC: 101 MG/DL (ref 0–100)
LYMPHOCYTES NFR BLD: 2.27 K/UL (ref 1.1–3.7)
LYMPHOCYTES RELATIVE PERCENT: 39 % (ref 24–43)
MCH RBC QN AUTO: 28.7 PG (ref 25.2–33.5)
MCHC RBC AUTO-ENTMCNC: 33.6 G/DL (ref 25.2–33.5)
MCV RBC AUTO: 85.5 FL (ref 82.6–102.9)
MONOCYTES NFR BLD: 0.38 K/UL (ref 0.1–1.2)
MONOCYTES NFR BLD: 7 % (ref 3–12)
NEUTROPHILS NFR BLD: 51 % (ref 36–65)
NEUTS SEG NFR BLD: 3.07 K/UL (ref 1.5–8.1)
NRBC BLD-RTO: 0 PER 100 WBC
PLATELET # BLD AUTO: 181 K/UL (ref 138–453)
PMV BLD AUTO: 9.7 FL (ref 8.1–13.5)
POTASSIUM SERPL-SCNC: 4.2 MMOL/L (ref 3.7–5.3)
PROT SERPL-MCNC: 7.1 G/DL (ref 6.4–8.3)
RBC # BLD AUTO: 5.05 M/UL (ref 3.95–5.11)
SODIUM SERPL-SCNC: 137 MMOL/L (ref 135–144)
TRIGL SERPL-MCNC: 283 MG/DL
TSH SERPL DL<=0.05 MIU/L-ACNC: 1.18 UIU/ML (ref 0.3–5)
VLDLC SERPL CALC-MCNC: 57 MG/DL
WBC OTHER # BLD: 5.9 K/UL (ref 3.5–11.3)

## 2024-04-22 PROCEDURE — 85025 COMPLETE CBC W/AUTO DIFF WBC: CPT

## 2024-04-22 PROCEDURE — 84443 ASSAY THYROID STIM HORMONE: CPT

## 2024-04-22 PROCEDURE — 80053 COMPREHEN METABOLIC PANEL: CPT

## 2024-04-22 PROCEDURE — 80061 LIPID PANEL: CPT

## 2024-04-22 PROCEDURE — 85652 RBC SED RATE AUTOMATED: CPT

## 2024-04-22 PROCEDURE — 82306 VITAMIN D 25 HYDROXY: CPT

## 2024-04-22 PROCEDURE — 86140 C-REACTIVE PROTEIN: CPT

## 2024-04-22 PROCEDURE — 72100 X-RAY EXAM L-S SPINE 2/3 VWS: CPT

## 2024-04-22 PROCEDURE — 36415 COLL VENOUS BLD VENIPUNCTURE: CPT

## 2024-08-14 NOTE — PROGRESS NOTES
[facility] RHEUMATOLOGY CONSULT   Date Of Service: 8/20/2024  Provider: LUCIA MARRERO DO, DO  Name: Yessica Nicholas   MRN: [id number]    Subjective     CC: No chief complaint on file.         Yessica Nicholas   is a(n)59 y.o. female with a hx of  has a past medical history of Allergic rhinitis, Cubital tunnel syndrome, carpal tunnel - s/p release , Dysmenorrhea, Elevated fasting glucose, Hypertension, Obesity, Orbital fracture (HCC), Plantar fasciitis, Pre-diabetes, and Shingles.  Psoriasis chronic low back pain, - wrist fractures bilateral - required surgical fixation - one after falling down stiars and another after falling on ice. 2018/2021.  Mother w/ osteoporosis. Endometrial ablation in 2014.  referred by Arianne Cabrera, * for evaluation of  crp elevation     Scalp psoriasis diagnosed in 2022 -treated by dermatology (dr. Barragan) - scalp, posterior ears and forearms. , + nail changes with pitting and grooves. - treated with topical with some improvement.      Lower back x 5 (?2019) . Reported constant swelling of the entire toe(s) and swelling senastion along the pads of the toes and occasional shooting pain into the toes. Present for the the past year. Pain can inreased up to 8/10. . starting around 1 year ago. Most severe in the morning - with morning stiffness > 60 minutes. Alleviating: hot shower. Movement. Aggravating: prolonged sitting.  Reported pain and swelling with heal spuring along the posterior Right heal     Diarrhea with 2 bm per day - present for years -- + urgency. + denies mucous blood or black tary stool.  -- aggravating - po intake. Family history of Crohn's - mother     Tingling along the soles of the feet.       -denies Photosenstivity, Rash, dry mouth/dry eyes, oral/nasal sores, Raynaud's, digital ulcerations, skin tightening, renal disease,foamy urination, hematuria, sz's, blood clots, pre-term labor loss, AIHA,leukpenia/lymphopenia, thrombocytopenia, hair loss, serositis,

## 2024-08-19 ASSESSMENT — RHEUMATOLOGY NEW PATIENT QUESTIONNAIRE
DIFFICULTY STAYING ASLEEP: Y
RASH: Y
MORNING STIFFNESS IN LOWER BACK: Y
NUMBNESS OR TINGLING IN HANDS OR FEET: Y
EXCESSIVE HAIR LOSS (MORE THAN YOUR NORM): Y
HOW WOULD YOU DESCRIBE YOUR STIFFNESS ON AVERAGE: MODERATE
SUN SENSITIVE (SUN ALLERGY): Y
PERSISTENT DIARRHEA: Y
UNUSUAL FATIGUE: Y
NIGHT SWEATS: Y
AGITATION: Y
MORNING STIFFNESS: Y
JOINT SWELLING: N
SWOLLEN LEGS OR FEET: Y
UNEXPLAINED WEIGHT CHANGE: Y
JOINT PAIN: Y

## 2024-08-20 ENCOUNTER — OFFICE VISIT (OUTPATIENT)
Dept: RHEUMATOLOGY | Age: 59
End: 2024-08-20
Payer: COMMERCIAL

## 2024-08-20 VITALS
DIASTOLIC BLOOD PRESSURE: 74 MMHG | OXYGEN SATURATION: 95 % | WEIGHT: 216.6 LBS | SYSTOLIC BLOOD PRESSURE: 122 MMHG | HEART RATE: 83 BPM | HEIGHT: 64 IN | BODY MASS INDEX: 36.98 KG/M2

## 2024-08-20 DIAGNOSIS — L40.9 PSORIASIS: Primary | ICD-10-CM

## 2024-08-20 DIAGNOSIS — E66.01 SEVERE OBESITY (BMI 35.0-39.9) WITH COMORBIDITY (HCC): ICD-10-CM

## 2024-08-20 DIAGNOSIS — L40.50 PSORIATIC ARTHRITIS (HCC): ICD-10-CM

## 2024-08-20 DIAGNOSIS — G89.29 CHRONIC MIDLINE LOW BACK PAIN WITHOUT SCIATICA: ICD-10-CM

## 2024-08-20 DIAGNOSIS — M54.50 CHRONIC MIDLINE LOW BACK PAIN WITHOUT SCIATICA: ICD-10-CM

## 2024-08-20 PROCEDURE — 3074F SYST BP LT 130 MM HG: CPT | Performed by: INTERNAL MEDICINE

## 2024-08-20 PROCEDURE — 99204 OFFICE O/P NEW MOD 45 MIN: CPT | Performed by: INTERNAL MEDICINE

## 2024-08-20 PROCEDURE — 3078F DIAST BP <80 MM HG: CPT | Performed by: INTERNAL MEDICINE

## 2024-08-20 ASSESSMENT — ENCOUNTER SYMPTOMS
ABDOMINAL PAIN: 1
DIARRHEA: 1
COLOR CHANGE: 0
RESPIRATORY NEGATIVE: 1
EYES NEGATIVE: 1

## 2024-08-26 ENCOUNTER — HOSPITAL ENCOUNTER (OUTPATIENT)
Age: 59
Discharge: HOME | End: 2024-08-26
Payer: COMMERCIAL

## 2024-08-26 DIAGNOSIS — L40.9 PSORIASIS: ICD-10-CM

## 2024-08-26 DIAGNOSIS — E66.01 SEVERE OBESITY (BMI 35.0-39.9) WITH COMORBIDITY (HCC): ICD-10-CM

## 2024-08-26 DIAGNOSIS — G89.29 CHRONIC MIDLINE LOW BACK PAIN WITHOUT SCIATICA: ICD-10-CM

## 2024-08-26 DIAGNOSIS — M54.50 CHRONIC MIDLINE LOW BACK PAIN WITHOUT SCIATICA: ICD-10-CM

## 2024-08-26 DIAGNOSIS — L40.50 PSORIATIC ARTHRITIS (HCC): ICD-10-CM

## 2024-08-26 LAB
ALBUMIN SERPL-MCNC: 4.2 G/DL (ref 3.5–5.2)
ALBUMIN/GLOB SERPL: 1.4 {RATIO} (ref 1–2.5)
ALP SERPL-CCNC: 86 U/L (ref 35–104)
ALT SERPL-CCNC: 40 U/L (ref 5–33)
ANION GAP SERPL CALCULATED.3IONS-SCNC: 10 MMOL/L (ref 9–17)
AST SERPL-CCNC: 24 U/L
BASOPHILS # BLD: 0.05 K/UL (ref 0–0.2)
BASOPHILS NFR BLD: 1 % (ref 0–2)
BILIRUB SERPL-MCNC: 0.4 MG/DL (ref 0.3–1.2)
BUN SERPL-MCNC: 12 MG/DL (ref 6–20)
BUN/CREAT SERPL: 13 (ref 9–20)
CALCIUM SERPL-MCNC: 9.2 MG/DL (ref 8.6–10.4)
CHLORIDE SERPL-SCNC: 102 MMOL/L (ref 98–107)
CO2 SERPL-SCNC: 27 MMOL/L (ref 20–31)
CREAT SERPL-MCNC: 0.9 MG/DL (ref 0.5–0.9)
CRP SERPL HS-MCNC: 7.6 MG/L (ref 0–5)
EOSINOPHIL # BLD: 0.08 K/UL (ref 0–0.44)
EOSINOPHILS RELATIVE PERCENT: 1 % (ref 1–4)
ERYTHROCYTE [DISTWIDTH] IN BLOOD BY AUTOMATED COUNT: 13.2 % (ref 11.8–14.4)
ERYTHROCYTE [SEDIMENTATION RATE] IN BLOOD BY PHOTOMETRIC METHOD: 8 MM/HR (ref 0–30)
GFR, ESTIMATED: 74 ML/MIN/1.73M2
GLUCOSE SERPL-MCNC: 148 MG/DL (ref 70–99)
HAV IGM SERPL QL IA: NONREACTIVE
HBV CORE IGM SERPL QL IA: NONREACTIVE
HBV SURFACE AG SERPL QL IA: NONREACTIVE
HCT VFR BLD AUTO: 41.4 % (ref 36.3–47.1)
HCV AB SERPL QL IA: NONREACTIVE
HGB BLD-MCNC: 14.4 G/DL (ref 11.9–15.1)
IMM GRANULOCYTES # BLD AUTO: <0.03 K/UL (ref 0–0.3)
IMM GRANULOCYTES NFR BLD: 0 %
LYMPHOCYTES NFR BLD: 2.12 K/UL (ref 1.1–3.7)
LYMPHOCYTES RELATIVE PERCENT: 37 % (ref 24–43)
MCH RBC QN AUTO: 29.7 PG (ref 25.2–33.5)
MCHC RBC AUTO-ENTMCNC: 34.8 G/DL (ref 25.2–33.5)
MCV RBC AUTO: 85.4 FL (ref 82.6–102.9)
MONOCYTES NFR BLD: 0.43 K/UL (ref 0.1–1.2)
MONOCYTES NFR BLD: 8 % (ref 3–12)
NEUTROPHILS NFR BLD: 53 % (ref 36–65)
NEUTS SEG NFR BLD: 3.01 K/UL (ref 1.5–8.1)
NRBC BLD-RTO: 0 PER 100 WBC
PLATELET # BLD AUTO: 190 K/UL (ref 138–453)
PMV BLD AUTO: 10.2 FL (ref 8.1–13.5)
POTASSIUM SERPL-SCNC: 4.2 MMOL/L (ref 3.7–5.3)
PROT SERPL-MCNC: 7.3 G/DL (ref 6.4–8.3)
RBC # BLD AUTO: 4.85 M/UL (ref 3.95–5.11)
SODIUM SERPL-SCNC: 139 MMOL/L (ref 135–144)
WBC OTHER # BLD: 5.7 K/UL (ref 3.5–11.3)

## 2024-08-26 PROCEDURE — 86140 C-REACTIVE PROTEIN: CPT

## 2024-08-26 PROCEDURE — 82784 ASSAY IGA/IGD/IGG/IGM EACH: CPT

## 2024-08-26 PROCEDURE — 86235 NUCLEAR ANTIGEN ANTIBODY: CPT

## 2024-08-26 PROCEDURE — 85652 RBC SED RATE AUTOMATED: CPT

## 2024-08-26 PROCEDURE — 80053 COMPREHEN METABOLIC PANEL: CPT

## 2024-08-26 PROCEDURE — 36415 COLL VENOUS BLD VENIPUNCTURE: CPT

## 2024-08-26 PROCEDURE — 85025 COMPLETE CBC W/AUTO DIFF WBC: CPT

## 2024-08-26 PROCEDURE — 83516 IMMUNOASSAY NONANTIBODY: CPT

## 2024-08-26 PROCEDURE — 86200 CCP ANTIBODY: CPT

## 2024-08-26 PROCEDURE — 80074 ACUTE HEPATITIS PANEL: CPT

## 2024-08-26 PROCEDURE — 86480 TB TEST CELL IMMUN MEASURE: CPT

## 2024-08-27 LAB
ENA SS-A IGG SER QL: <0.3 U/ML
ENA SS-B IGG SER IA-ACNC: <0.3 U/ML

## 2024-08-28 LAB
CCP AB SER IA-ACNC: 0.9 U/ML (ref 0–7)
GLIADIN IGA SER IA-ACNC: 2.1 U/ML
GLIADIN IGG SER IA-ACNC: <0.4 U/ML
IGA SERPL-MCNC: 223 MG/DL (ref 70–400)
QUANTI TB GOLD PLUS: NEGATIVE
QUANTI TB1 MINUS NIL: 0.01 IU/ML
QUANTI TB2 MINUS NIL: 0 IU/ML
QUANTIFERON MITOGEN: 9.97 IU/ML
QUANTIFERON NIL: 0.03 IU/ML
TTG IGA SER IA-ACNC: 0.5 U/ML

## 2024-09-09 ENCOUNTER — HOSPITAL ENCOUNTER (OUTPATIENT)
Dept: GENERAL RADIOLOGY | Age: 59
Discharge: HOME | End: 2024-09-11
Payer: COMMERCIAL

## 2024-09-09 DIAGNOSIS — G89.29 CHRONIC MIDLINE LOW BACK PAIN WITHOUT SCIATICA: ICD-10-CM

## 2024-09-09 DIAGNOSIS — E66.01 SEVERE OBESITY (BMI 35.0-39.9) WITH COMORBIDITY (HCC): ICD-10-CM

## 2024-09-09 DIAGNOSIS — M54.50 CHRONIC MIDLINE LOW BACK PAIN WITHOUT SCIATICA: ICD-10-CM

## 2024-09-09 DIAGNOSIS — L40.50 PSORIATIC ARTHRITIS (HCC): ICD-10-CM

## 2024-09-09 DIAGNOSIS — L40.9 PSORIASIS: ICD-10-CM

## 2024-09-09 PROCEDURE — 72100 X-RAY EXAM L-S SPINE 2/3 VWS: CPT

## 2024-09-09 PROCEDURE — 73630 X-RAY EXAM OF FOOT: CPT

## 2024-09-09 PROCEDURE — 71046 X-RAY EXAM CHEST 2 VIEWS: CPT

## 2024-09-09 PROCEDURE — 72202 X-RAY EXAM SI JOINTS 3/> VWS: CPT

## 2024-09-09 PROCEDURE — 73130 X-RAY EXAM OF HAND: CPT

## 2024-09-11 ENCOUNTER — TELEPHONE (OUTPATIENT)
Dept: RHEUMATOLOGY | Age: 59
End: 2024-09-11

## 2024-09-11 ENCOUNTER — PATIENT MESSAGE (OUTPATIENT)
Dept: RHEUMATOLOGY | Age: 59
End: 2024-09-11

## 2024-10-21 ENCOUNTER — OFFICE VISIT (OUTPATIENT)
Dept: RHEUMATOLOGY | Age: 59
End: 2024-10-21
Payer: COMMERCIAL

## 2024-10-21 VITALS
DIASTOLIC BLOOD PRESSURE: 84 MMHG | OXYGEN SATURATION: 98 % | WEIGHT: 220.2 LBS | HEART RATE: 83 BPM | HEIGHT: 64 IN | BODY MASS INDEX: 37.59 KG/M2 | SYSTOLIC BLOOD PRESSURE: 120 MMHG

## 2024-10-21 DIAGNOSIS — E66.01 SEVERE OBESITY (BMI 35.0-39.9) WITH COMORBIDITY: ICD-10-CM

## 2024-10-21 DIAGNOSIS — L40.50 PSORIATIC ARTHRITIS (HCC): Primary | ICD-10-CM

## 2024-10-21 DIAGNOSIS — M54.50 CHRONIC MIDLINE LOW BACK PAIN WITHOUT SCIATICA: ICD-10-CM

## 2024-10-21 DIAGNOSIS — G89.29 CHRONIC MIDLINE LOW BACK PAIN WITHOUT SCIATICA: ICD-10-CM

## 2024-10-21 DIAGNOSIS — L40.9 PSORIASIS: ICD-10-CM

## 2024-10-21 PROCEDURE — 3079F DIAST BP 80-89 MM HG: CPT | Performed by: NURSE PRACTITIONER

## 2024-10-21 PROCEDURE — 3074F SYST BP LT 130 MM HG: CPT | Performed by: NURSE PRACTITIONER

## 2024-10-21 PROCEDURE — 99214 OFFICE O/P EST MOD 30 MIN: CPT | Performed by: NURSE PRACTITIONER

## 2024-10-21 ASSESSMENT — ENCOUNTER SYMPTOMS
EYE PAIN: 0
TROUBLE SWALLOWING: 0
CONSTIPATION: 0
DIARRHEA: 0
BACK PAIN: 1
ABDOMINAL PAIN: 0
COUGH: 0
EYE ITCHING: 0
SHORTNESS OF BREATH: 0
NAUSEA: 0

## 2024-10-21 NOTE — PROGRESS NOTES
[facility] RHEUMATOLOGY FOLLOW UP NOTE     Date Of Service: 10/21/2024  Provider: KAYDEN Delgado CNP ,   PCP: Arianne Cabrera APRN - CNP   Name: Yessica Nicholas   MRN: [id number]    Subjective   CHIEF COMPLAINT:    Chief Complaint   Patient presents with    Follow-up     2-month f/u for psoriasis.        Yessica Nicholas  is a(n)59 y.o. female  here for the f/u evaluation of psoriatic arthritis      Interval hx:    -  no new issues     pain affecting the low back, feet  Pain on a scale 0-10: 5/10  Type of pain: stiffness  Timing: mornings  Aggravating factors: prolonged sitting  Alleviating factors: hot shower, movement      Associated symptoms:  denies swelling/  Redness/ warmth , + AM stiffness lasting ~ 30-60 mins     REVIEW OF SYSTEMS: (ROS)    Review of Systems   Constitutional:  Negative for fatigue, fever and unexpected weight change.   HENT:  Negative for congestion and trouble swallowing.    Eyes:  Negative for pain and itching.   Respiratory:  Negative for cough and shortness of breath.    Cardiovascular:  Negative for chest pain and leg swelling.   Gastrointestinal:  Negative for abdominal pain, constipation, diarrhea and nausea.   Endocrine: Negative for cold intolerance and heat intolerance.   Genitourinary:  Negative for difficulty urinating, frequency and urgency.   Musculoskeletal:  Positive for arthralgias and back pain. Negative for joint swelling.   Skin:  Positive for rash.   Neurological:  Positive for numbness (feet). Negative for dizziness, weakness and headaches.   Psychiatric/Behavioral:  The patient is not nervous/anxious.        Past Medical History:  has a past medical history of Allergic rhinitis, Cubital tunnel syndrome, Dysmenorrhea, Elevated fasting glucose, Hypertension, Obesity, Orbital fracture, Plantar fasciitis, Pre-diabetes, Psoriasis, and Shingles.    Current Medications:    Current Outpatient Medications   Medication Instructions    fluocinolone

## 2024-10-22 ENCOUNTER — TELEPHONE (OUTPATIENT)
Dept: RHEUMATOLOGY | Age: 59
End: 2024-10-22

## 2024-10-22 DIAGNOSIS — L40.50 PSORIATIC ARTHRITIS (HCC): Primary | ICD-10-CM

## 2024-10-22 RX ORDER — MEDROXYPROGESTERONE ACETATE 150 MG/ML
50 INJECTION, SUSPENSION INTRAMUSCULAR WEEKLY
Qty: 4 ML | Refills: 5 | Status: ACTIVE | OUTPATIENT
Start: 2024-10-22

## 2024-10-22 NOTE — TELEPHONE ENCOUNTER
It is recommended to utilize another DMARD with it to avoid loss of effectiveness over time. If she is wanted to avoid that, we can do enbrel instead, but this does not work in the gut if that is involved.

## 2024-10-22 NOTE — TELEPHONE ENCOUNTER
Patient notified. Patient is asking if Humira is a medication she would have to take a second drug with, like methotrexate.

## 2024-10-22 NOTE — TELEPHONE ENCOUNTER
----- Message from Manuel SALAZAR sent at 10/22/2024 12:00 PM EDT -----  Regarding: Deena Rosado there,    Plan denied initial PA - Asks if there is a trial and failure of two agents from the following list:  Stelara, Skyrizi, Cosentyx, Xelrollyz, Rinvow, Enbrel, Humira (IL-23, Interleukin IL-17, GRAYSON, TNF)    Let me know how youd like to proceed

## 2024-11-25 ENCOUNTER — HOSPITAL ENCOUNTER (OUTPATIENT)
Dept: MAMMOGRAPHY | Age: 59
Discharge: HOME | End: 2024-11-27
Payer: COMMERCIAL

## 2024-11-25 VITALS — BODY MASS INDEX: 37.74 KG/M2 | WEIGHT: 220 LBS

## 2024-11-25 DIAGNOSIS — Z12.31 SCREENING MAMMOGRAM FOR BREAST CANCER: ICD-10-CM

## 2024-11-25 PROCEDURE — 77063 BREAST TOMOSYNTHESIS BI: CPT

## 2024-11-25 PROCEDURE — 77067 SCR MAMMO BI INCL CAD: CPT

## 2024-12-23 ENCOUNTER — OFFICE VISIT (OUTPATIENT)
Age: 59
End: 2024-12-23
Payer: COMMERCIAL

## 2024-12-23 VITALS
OXYGEN SATURATION: 98 % | DIASTOLIC BLOOD PRESSURE: 76 MMHG | HEIGHT: 64 IN | WEIGHT: 218 LBS | HEART RATE: 75 BPM | BODY MASS INDEX: 37.22 KG/M2 | SYSTOLIC BLOOD PRESSURE: 128 MMHG

## 2024-12-23 DIAGNOSIS — Z51.81 MEDICATION MONITORING ENCOUNTER: ICD-10-CM

## 2024-12-23 DIAGNOSIS — M54.50 CHRONIC MIDLINE LOW BACK PAIN WITHOUT SCIATICA: ICD-10-CM

## 2024-12-23 DIAGNOSIS — L40.50 PSORIATIC ARTHRITIS (HCC): Primary | ICD-10-CM

## 2024-12-23 DIAGNOSIS — L40.9 PSORIASIS: ICD-10-CM

## 2024-12-23 DIAGNOSIS — G89.29 CHRONIC MIDLINE LOW BACK PAIN WITHOUT SCIATICA: ICD-10-CM

## 2024-12-23 PROCEDURE — 99214 OFFICE O/P EST MOD 30 MIN: CPT | Performed by: NURSE PRACTITIONER

## 2024-12-23 PROCEDURE — 3074F SYST BP LT 130 MM HG: CPT | Performed by: NURSE PRACTITIONER

## 2024-12-23 PROCEDURE — 3078F DIAST BP <80 MM HG: CPT | Performed by: NURSE PRACTITIONER

## 2024-12-23 ASSESSMENT — ENCOUNTER SYMPTOMS
SHORTNESS OF BREATH: 0
COUGH: 0
EYE PAIN: 0
ABDOMINAL PAIN: 0
DIARRHEA: 0
EYE ITCHING: 0
BACK PAIN: 1
CONSTIPATION: 0
TROUBLE SWALLOWING: 0
NAUSEA: 0

## 2024-12-23 NOTE — PROGRESS NOTES
08/26/2024 1101     08/26/2024 1101    CO2 27 08/26/2024 1101    BUN 12 08/26/2024 1101    CREATININE 0.9 08/26/2024 1101        Component Value Date/Time    CALCIUM 9.2 08/26/2024 1101    ALKPHOS 86 08/26/2024 1101    AST 24 08/26/2024 1101    ALT 40 (H) 08/26/2024 1101    BILITOT 0.4 08/26/2024 1101            Lab Results   Component Value Date    SEDRATE 8 08/26/2024    SEDRATE 8 04/22/2024    SEDRATE 14 12/07/2022    CRP 7.6 (H) 08/26/2024    CRP 8.9 (H) 04/22/2024    CRP 0.8 (H) 12/07/2022       RADIOLOGY / PROCEDURES:     Assessment  / Plan     Psoriatic arthritis  - dactylitis, enthesitis, synovitis, psoriasis, SI joint DJD. Fmhx of crohn's in mother  - prior tx: enbrel (injection site reaction)   - humira 40 mg subcu q 2 weeks (12/7/2024)    Psoriasis- active scalp, left arm     Morbid obesity- can decrease effectiveness of medications.     Chronic low back pain  - degenerative changes in SI joints and osteophytic changes in thoracic spine. Patient with lumbosacral tenderness and prolonged AM stiffness.     Paresthesia of bilateral feet L>R   - discussed EMG if symptoms persist    Diarrhea- improved   - fecal urgency and up to 2 BM's per day worse after oral intake. Last colonoscopy in 2021   - fecal calprotectin ordered- not yet completed     Medication monitoring   - TB gold negative (8/2024)   - CBC, CMP, sed rate, CRP q 6 months         No follow-ups on file.

## 2025-02-24 ENCOUNTER — TELEPHONE (OUTPATIENT)
Age: 60
End: 2025-02-24

## 2025-02-24 ENCOUNTER — OFFICE VISIT (OUTPATIENT)
Age: 60
End: 2025-02-24
Payer: COMMERCIAL

## 2025-02-24 VITALS
HEIGHT: 64 IN | DIASTOLIC BLOOD PRESSURE: 80 MMHG | BODY MASS INDEX: 37.3 KG/M2 | SYSTOLIC BLOOD PRESSURE: 124 MMHG | OXYGEN SATURATION: 91 % | HEART RATE: 77 BPM | WEIGHT: 218.5 LBS

## 2025-02-24 DIAGNOSIS — L40.50 PSORIATIC ARTHRITIS (HCC): Primary | ICD-10-CM

## 2025-02-24 DIAGNOSIS — R20.2 PARESTHESIA OF FOOT: ICD-10-CM

## 2025-02-24 DIAGNOSIS — L40.9 PSORIASIS: ICD-10-CM

## 2025-02-24 DIAGNOSIS — E66.01 SEVERE OBESITY (BMI 35.0-39.9) WITH COMORBIDITY: ICD-10-CM

## 2025-02-24 DIAGNOSIS — M54.50 CHRONIC MIDLINE LOW BACK PAIN WITHOUT SCIATICA: ICD-10-CM

## 2025-02-24 DIAGNOSIS — G89.29 CHRONIC MIDLINE LOW BACK PAIN WITHOUT SCIATICA: ICD-10-CM

## 2025-02-24 DIAGNOSIS — Z51.81 MEDICATION MONITORING ENCOUNTER: ICD-10-CM

## 2025-02-24 PROCEDURE — 99214 OFFICE O/P EST MOD 30 MIN: CPT | Performed by: INTERNAL MEDICINE

## 2025-02-24 PROCEDURE — 3074F SYST BP LT 130 MM HG: CPT | Performed by: INTERNAL MEDICINE

## 2025-02-24 PROCEDURE — 3079F DIAST BP 80-89 MM HG: CPT | Performed by: INTERNAL MEDICINE

## 2025-02-24 RX ORDER — CLOBETASOL PROPIONATE 0.05 G/100ML
SHAMPOO TOPICAL
Qty: 118 ML | Refills: 1 | Status: SHIPPED | OUTPATIENT
Start: 2025-02-24

## 2025-02-24 RX ORDER — GABAPENTIN 300 MG/1
300 CAPSULE ORAL NIGHTLY
Qty: 90 CAPSULE | Refills: 1 | Status: SHIPPED | OUTPATIENT
Start: 2025-02-24 | End: 2025-08-23

## 2025-02-24 ASSESSMENT — ENCOUNTER SYMPTOMS
TROUBLE SWALLOWING: 0
NAUSEA: 0
EYE PAIN: 0
EYE ITCHING: 0
COUGH: 0
BACK PAIN: 1
ABDOMINAL PAIN: 0
SHORTNESS OF BREATH: 0
CONSTIPATION: 0
DIARRHEA: 0

## 2025-02-24 NOTE — PROGRESS NOTES
well-developed.   Cardiovascular:      Rate and Rhythm: Normal rate and regular rhythm.   Pulmonary:      Effort: Pulmonary effort is normal.      Breath sounds: Normal breath sounds.   Musculoskeletal:         General: Tenderness present. No swelling.      Cervical back: Normal range of motion and neck supple.   Skin:     General: Skin is warm and dry.      Comments: White plaques with silver sclaing in the scalp   Neurological:      Mental Status: She is alert and oriented to person, place, and time.      Deep Tendon Reflexes: Reflexes abnormal. Babinski sign absent on the right side. Babinski sign absent on the left side.      Reflex Scores:       Patellar reflexes are 3+ on the right side and 2+ on the left side.  Psychiatric:         Thought Content: Thought content normal.         Musculoskeletal:    Upper extremities:  -- Non-tender, No swelling     Lower extremities:  HIPS      nontender  KNEES   nontender  ANKLES nontender   FEET :    + heel spurring bilat  , Non-tender       Spine:   Tender sacral sulci.  Negative SLR, Andrez testing bilateral.          LABS      Lab Results   Component Value Date    WBC 5.7 08/26/2024    HGB 14.4 08/26/2024     08/26/2024    RBC 4.85 08/26/2024    MCH 29.7 08/26/2024    MCHC 34.8 (H) 08/26/2024    RDW 13.2 08/26/2024         Chemistry        Component Value Date/Time     08/26/2024 1101    K 4.2 08/26/2024 1101     08/26/2024 1101    CO2 27 08/26/2024 1101    BUN 12 08/26/2024 1101    CREATININE 0.9 08/26/2024 1101        Component Value Date/Time    CALCIUM 9.2 08/26/2024 1101    ALKPHOS 86 08/26/2024 1101    AST 24 08/26/2024 1101    ALT 40 (H) 08/26/2024 1101    BILITOT 0.4 08/26/2024 1101            Lab Results   Component Value Date    SEDRATE 8 08/26/2024    SEDRATE 8 04/22/2024    SEDRATE 14 12/07/2022    CRP 7.6 (H) 08/26/2024    CRP 8.9 (H) 04/22/2024    CRP 0.8 (H) 12/07/2022       RADIOLOGY / PROCEDURES:             No follow-ups on file.

## 2025-02-24 NOTE — TELEPHONE ENCOUNTER
Patient was checking out and noticed that her outgoing referral was going to a place in [place] and the referral needed sent to [facility] in [place].  Please advise.  Thank you

## 2025-03-08 ENCOUNTER — HOSPITAL ENCOUNTER (OUTPATIENT)
Age: 60
Discharge: HOME | End: 2025-03-08
Payer: COMMERCIAL

## 2025-03-08 DIAGNOSIS — Z51.81 MEDICATION MONITORING ENCOUNTER: ICD-10-CM

## 2025-03-08 LAB
ALBUMIN SERPL-MCNC: 4.4 G/DL (ref 3.5–5.2)
ALBUMIN/GLOB SERPL: 1.4 {RATIO} (ref 1–2.5)
ALP SERPL-CCNC: 67 U/L (ref 35–104)
ALT SERPL-CCNC: 45 U/L (ref 5–33)
ANION GAP SERPL CALCULATED.3IONS-SCNC: 13 MMOL/L (ref 9–17)
AST SERPL-CCNC: 30 U/L
BASOPHILS # BLD: 0.04 K/UL (ref 0–0.2)
BASOPHILS NFR BLD: 1 % (ref 0–2)
BILIRUB SERPL-MCNC: 0.8 MG/DL (ref 0.3–1.2)
BUN SERPL-MCNC: 13 MG/DL (ref 6–20)
BUN/CREAT SERPL: 14 (ref 9–20)
CALCIUM SERPL-MCNC: 9.5 MG/DL (ref 8.6–10.4)
CHLORIDE SERPL-SCNC: 95 MMOL/L (ref 98–107)
CO2 SERPL-SCNC: 27 MMOL/L (ref 20–31)
CREAT SERPL-MCNC: 0.9 MG/DL (ref 0.5–0.9)
CRP SERPL HS-MCNC: 6 MG/L (ref 0–5)
EOSINOPHIL # BLD: 0.18 K/UL (ref 0–0.44)
EOSINOPHILS RELATIVE PERCENT: 3 % (ref 1–4)
ERYTHROCYTE [DISTWIDTH] IN BLOOD BY AUTOMATED COUNT: 13.2 % (ref 11.8–14.4)
ERYTHROCYTE [SEDIMENTATION RATE] IN BLOOD BY PHOTOMETRIC METHOD: 16 MM/HR (ref 0–30)
GFR, ESTIMATED: 74 ML/MIN/1.73M2
GLUCOSE SERPL-MCNC: 121 MG/DL (ref 70–99)
HCT VFR BLD AUTO: 43.6 % (ref 36.3–47.1)
HGB BLD-MCNC: 14.9 G/DL (ref 11.9–15.1)
IMM GRANULOCYTES # BLD AUTO: <0.03 K/UL (ref 0–0.3)
IMM GRANULOCYTES NFR BLD: 0 %
LYMPHOCYTES NFR BLD: 2.53 K/UL (ref 1.1–3.7)
LYMPHOCYTES RELATIVE PERCENT: 39 % (ref 24–43)
MCH RBC QN AUTO: 28.2 PG (ref 25.2–33.5)
MCHC RBC AUTO-ENTMCNC: 34.2 G/DL (ref 25.2–33.5)
MCV RBC AUTO: 82.4 FL (ref 82.6–102.9)
MONOCYTES NFR BLD: 0.41 K/UL (ref 0.1–1.2)
MONOCYTES NFR BLD: 6 % (ref 3–12)
NEUTROPHILS NFR BLD: 51 % (ref 36–65)
NEUTS SEG NFR BLD: 3.28 K/UL (ref 1.5–8.1)
NRBC BLD-RTO: 0 PER 100 WBC
PLATELET # BLD AUTO: 218 K/UL (ref 138–453)
PMV BLD AUTO: 9.7 FL (ref 8.1–13.5)
POTASSIUM SERPL-SCNC: 4.3 MMOL/L (ref 3.7–5.3)
PROT SERPL-MCNC: 7.5 G/DL (ref 6.4–8.3)
RBC # BLD AUTO: 5.29 M/UL (ref 3.95–5.11)
RBC # BLD: ABNORMAL 10*6/UL
SODIUM SERPL-SCNC: 135 MMOL/L (ref 135–144)
WBC OTHER # BLD: 6.5 K/UL (ref 3.5–11.3)

## 2025-03-08 PROCEDURE — 36415 COLL VENOUS BLD VENIPUNCTURE: CPT

## 2025-03-08 PROCEDURE — 86140 C-REACTIVE PROTEIN: CPT

## 2025-03-08 PROCEDURE — 80053 COMPREHEN METABOLIC PANEL: CPT

## 2025-03-08 PROCEDURE — 85652 RBC SED RATE AUTOMATED: CPT

## 2025-03-08 PROCEDURE — 85025 COMPLETE CBC W/AUTO DIFF WBC: CPT

## 2025-03-10 ENCOUNTER — RESULTS FOLLOW-UP (OUTPATIENT)
Age: 60
End: 2025-03-10

## 2025-04-28 RX ORDER — ADALIMUMAB 40MG/0.4ML
KIT SUBCUTANEOUS
Qty: 2 EACH | Refills: 5 | Status: ACTIVE | OUTPATIENT
Start: 2025-04-28

## 2025-06-07 ENCOUNTER — HOSPITAL ENCOUNTER (OUTPATIENT)
Age: 60
Discharge: HOME | End: 2025-06-07
Payer: COMMERCIAL

## 2025-06-07 DIAGNOSIS — Z51.81 MEDICATION MONITORING ENCOUNTER: ICD-10-CM

## 2025-06-07 DIAGNOSIS — E03.4 HYPOTHYROIDISM DUE TO ACQUIRED ATROPHY OF THYROID: ICD-10-CM

## 2025-06-07 DIAGNOSIS — Z00.00 WELLNESS EXAMINATION: ICD-10-CM

## 2025-06-07 LAB
ALBUMIN SERPL-MCNC: 4.5 G/DL (ref 3.5–5.2)
ALBUMIN/GLOB SERPL: 1.4 {RATIO} (ref 1–2.5)
ALP SERPL-CCNC: 73 U/L (ref 35–104)
ALT SERPL-CCNC: 44 U/L (ref 10–35)
ANION GAP SERPL CALCULATED.3IONS-SCNC: 16 MMOL/L (ref 9–16)
AST SERPL-CCNC: 28 U/L (ref 10–35)
BASOPHILS # BLD: 0.05 K/UL (ref 0–0.2)
BASOPHILS NFR BLD: 1 % (ref 0–2)
BILIRUB SERPL-MCNC: 0.5 MG/DL (ref 0–1.2)
BUN SERPL-MCNC: 11 MG/DL (ref 6–20)
BUN/CREAT SERPL: 14 (ref 9–20)
CALCIUM SERPL-MCNC: 9.4 MG/DL (ref 8.6–10.4)
CHLORIDE SERPL-SCNC: 92 MMOL/L (ref 98–107)
CHOLEST SERPL-MCNC: 176 MG/DL (ref 0–199)
CHOLESTEROL/HDL RATIO: 4.2
CO2 SERPL-SCNC: 23 MMOL/L (ref 20–31)
CREAT SERPL-MCNC: 0.8 MG/DL (ref 0.6–0.9)
CRP SERPL HS-MCNC: 8.5 MG/L (ref 0–5)
EOSINOPHIL # BLD: 0.1 K/UL (ref 0–0.44)
EOSINOPHILS RELATIVE PERCENT: 1 % (ref 1–4)
ERYTHROCYTE [DISTWIDTH] IN BLOOD BY AUTOMATED COUNT: 13.5 % (ref 11.8–14.4)
ERYTHROCYTE [SEDIMENTATION RATE] IN BLOOD BY PHOTOMETRIC METHOD: 16 MM/HR (ref 0–30)
GFR, ESTIMATED: 85 ML/MIN/1.73M2
GLUCOSE SERPL-MCNC: 123 MG/DL (ref 74–99)
HCT VFR BLD AUTO: 43.9 % (ref 36.3–47.1)
HDLC SERPL-MCNC: 42 MG/DL
HGB BLD-MCNC: 15.4 G/DL (ref 11.9–15.1)
IMM GRANULOCYTES # BLD AUTO: <0.03 K/UL (ref 0–0.3)
IMM GRANULOCYTES NFR BLD: 0 %
LDLC SERPL CALC-MCNC: 89 MG/DL (ref 0–100)
LYMPHOCYTES NFR BLD: 2.49 K/UL (ref 1.1–3.7)
LYMPHOCYTES RELATIVE PERCENT: 35 % (ref 24–43)
MCH RBC QN AUTO: 29 PG (ref 25.2–33.5)
MCHC RBC AUTO-ENTMCNC: 35.1 G/DL (ref 25.2–33.5)
MCV RBC AUTO: 82.7 FL (ref 82.6–102.9)
MONOCYTES NFR BLD: 0.45 K/UL (ref 0.1–1.2)
MONOCYTES NFR BLD: 6 % (ref 3–12)
NEUTROPHILS NFR BLD: 57 % (ref 36–65)
NEUTS SEG NFR BLD: 3.98 K/UL (ref 1.5–8.1)
NRBC BLD-RTO: 0 PER 100 WBC
PLATELET # BLD AUTO: 234 K/UL (ref 138–453)
PMV BLD AUTO: 9.8 FL (ref 8.1–13.5)
POTASSIUM SERPL-SCNC: 3.9 MMOL/L (ref 3.7–5.3)
PROT SERPL-MCNC: 7.7 G/DL (ref 6.6–8.7)
RBC # BLD AUTO: 5.31 M/UL (ref 3.95–5.11)
SODIUM SERPL-SCNC: 131 MMOL/L (ref 136–145)
TRIGL SERPL-MCNC: 223 MG/DL
TSH SERPL DL<=0.05 MIU/L-ACNC: 1.26 UIU/ML (ref 0.27–4.2)
VLDLC SERPL CALC-MCNC: 45 MG/DL (ref 1–30)
WBC OTHER # BLD: 7.1 K/UL (ref 3.5–11.3)

## 2025-06-07 PROCEDURE — 80061 LIPID PANEL: CPT

## 2025-06-07 PROCEDURE — 80053 COMPREHEN METABOLIC PANEL: CPT

## 2025-06-07 PROCEDURE — 84443 ASSAY THYROID STIM HORMONE: CPT

## 2025-06-07 PROCEDURE — 85025 COMPLETE CBC W/AUTO DIFF WBC: CPT

## 2025-06-07 PROCEDURE — 85652 RBC SED RATE AUTOMATED: CPT

## 2025-06-07 PROCEDURE — 36415 COLL VENOUS BLD VENIPUNCTURE: CPT

## 2025-06-07 PROCEDURE — 86140 C-REACTIVE PROTEIN: CPT

## 2025-06-08 ENCOUNTER — RESULTS FOLLOW-UP (OUTPATIENT)
Age: 60
End: 2025-06-08

## 2025-06-11 ENCOUNTER — OFFICE VISIT (OUTPATIENT)
Age: 60
End: 2025-06-11
Payer: COMMERCIAL

## 2025-06-11 VITALS
OXYGEN SATURATION: 96 % | HEIGHT: 63 IN | BODY MASS INDEX: 38.02 KG/M2 | HEART RATE: 74 BPM | DIASTOLIC BLOOD PRESSURE: 78 MMHG | WEIGHT: 214.6 LBS | SYSTOLIC BLOOD PRESSURE: 122 MMHG

## 2025-06-11 DIAGNOSIS — L40.50 PSORIATIC ARTHRITIS (HCC): Primary | ICD-10-CM

## 2025-06-11 DIAGNOSIS — L40.9 PSORIASIS: ICD-10-CM

## 2025-06-11 DIAGNOSIS — G89.29 CHRONIC MIDLINE LOW BACK PAIN WITHOUT SCIATICA: ICD-10-CM

## 2025-06-11 DIAGNOSIS — Z51.81 MEDICATION MONITORING ENCOUNTER: ICD-10-CM

## 2025-06-11 DIAGNOSIS — M54.50 CHRONIC MIDLINE LOW BACK PAIN WITHOUT SCIATICA: ICD-10-CM

## 2025-06-11 PROCEDURE — 3074F SYST BP LT 130 MM HG: CPT | Performed by: INTERNAL MEDICINE

## 2025-06-11 PROCEDURE — 99214 OFFICE O/P EST MOD 30 MIN: CPT | Performed by: INTERNAL MEDICINE

## 2025-06-11 PROCEDURE — 3078F DIAST BP <80 MM HG: CPT | Performed by: INTERNAL MEDICINE

## 2025-06-11 RX ORDER — RISANKIZUMAB-RZAA 150 MG/ML
INJECTION SUBCUTANEOUS
Qty: 2 ML | Refills: 0 | Status: ACTIVE | OUTPATIENT
Start: 2025-06-11

## 2025-06-11 RX ORDER — RISANKIZUMAB-RZAA 150 MG/ML
INJECTION SUBCUTANEOUS
Qty: 1 ML | Refills: 11 | Status: ACTIVE | OUTPATIENT
Start: 2025-06-11

## 2025-06-11 ASSESSMENT — ENCOUNTER SYMPTOMS
EYES NEGATIVE: 1
GASTROINTESTINAL NEGATIVE: 1
RESPIRATORY NEGATIVE: 1

## 2025-06-11 NOTE — PROGRESS NOTES
ALKPHOS 73 06/07/2025 0811    AST 28 06/07/2025 0811    ALT 44 (H) 06/07/2025 0811    BILITOT 0.5 06/07/2025 0811            Lab Results   Component Value Date    SEDRATE 16 06/07/2025    SEDRATE 16 03/08/2025    SEDRATE 8 08/26/2024    CRP 8.5 (H) 06/07/2025    CRP 6.0 (H) 03/08/2025    CRP 7.6 (H) 08/26/2024       Assessment & Plan     Psoriatic arthritis  - dactylitis, enthesitis, synovitis, psoriasis, SI joint DJD. Fmhx of crohn's in mother  - prior tx: enbrel  2024 (injection site reaction)  - STOP Humira 40mg q 2 weeks - 12/2024. -June 2025 - partial response - ongoing inflammatory back pain and scalp psoriasis    - will pursue skyrizi 160mg subcu every 12 weeks after induction dosing.     Psoriasis- active scalp - clobetasol shampoo prescribed 2/24/25    - tx as above.     Chronic low back pain   - degenerative changes in SI joints and osteophytic changes in thoracic spine.   Patient with lumbosacral tenderness and prolonged AM stiffness.  - referral to phys therapy - pt with DTR 3/4 - right patellar, intact strength  - 2/24/25   - - discussed mri lumbar spine if the symptoms persist. -- pt wanting to hold on MRI evaluation     Morbid obesity- can decrease effectiveness of medications.     LFT elevation - ? Fatty liver disease - pt wanting to hold off on liver US        Paresthesia of bilateral feet L>R- resolved    - gabapentin 300mg at bed time started.     Medication monitoring   - TB gold negative (8/2024)   - CBC, CMP, sed rate, CRP q 3 months       No follow-ups on file.